# Patient Record
Sex: FEMALE | Race: WHITE | NOT HISPANIC OR LATINO | URBAN - METROPOLITAN AREA
[De-identification: names, ages, dates, MRNs, and addresses within clinical notes are randomized per-mention and may not be internally consistent; named-entity substitution may affect disease eponyms.]

---

## 2019-12-19 ENCOUNTER — EMERGENCY (EMERGENCY)
Facility: HOSPITAL | Age: 24
LOS: 1 days | Discharge: HOME | End: 2019-12-19
Attending: EMERGENCY MEDICINE
Payer: SELF-PAY

## 2019-12-19 VITALS
DIASTOLIC BLOOD PRESSURE: 93 MMHG | WEIGHT: 134.92 LBS | RESPIRATION RATE: 16 BRPM | SYSTOLIC BLOOD PRESSURE: 140 MMHG | HEART RATE: 94 BPM | OXYGEN SATURATION: 99 % | HEIGHT: 63 IN | TEMPERATURE: 99 F

## 2019-12-19 VITALS
RESPIRATION RATE: 18 BRPM | OXYGEN SATURATION: 99 % | SYSTOLIC BLOOD PRESSURE: 132 MMHG | DIASTOLIC BLOOD PRESSURE: 89 MMHG | HEART RATE: 89 BPM | TEMPERATURE: 98 F

## 2019-12-19 PROCEDURE — 99283 EMERGENCY DEPT VISIT LOW MDM: CPT

## 2019-12-19 PROCEDURE — 73130 X-RAY EXAM OF HAND: CPT | Mod: 26,RT

## 2019-12-19 PROCEDURE — 73130 X-RAY EXAM OF HAND: CPT

## 2019-12-19 RX ORDER — IBUPROFEN 200 MG
600 TABLET ORAL ONCE
Refills: 0 | Status: COMPLETED | OUTPATIENT
Start: 2019-12-19 | End: 2019-12-19

## 2019-12-19 RX ADMIN — Medication 600 MILLIGRAM(S): at 19:26
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
## 2019-12-19 NOTE — ED PROVIDER NOTE - NSFOLLOWUPINSTRUCTIONS_ED_ALL_ED_FT
IMPORTANT INSTRUCTIONS FROM Dr. ALAS:    Please follow up with your personal medical doctor or the police surgeon in 24-48 hours.   Bring results from today to your visit.    If you were advised to take any medications - be sure to review the package insert.    If your symptoms change, get worse or if you have any new symptoms, come to the ER right away.  If you have any questions, call the ER at the phone number on this page.
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
## 2019-12-19 NOTE — ED PROVIDER NOTE - CARE PLAN
Principal Discharge DX:	Hand pain, right Principal Discharge DX:	Contusion of right hand, initial encounter

## 2019-12-19 NOTE — ED PROVIDER NOTE - SKIN, MLM
no lacerations, no abrasions, no swelling. no lacerations, no abrasions, minimal r volar hand swelling.

## 2019-12-19 NOTE — ED PROVIDER NOTE - PHYSICAL EXAMINATION
Musculoskeletal: No tenderness to the wrist, bone, finger or hand. Good strength of flexion and extension. Musculoskeletal: No tenderness to the wrist, bone, finger or hand. Good strength of flexion and extension wrist/fingers. minimal swelling.
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
## 2019-12-19 NOTE — ED PROVIDER NOTE - CARE PROVIDER_API CALL
Eyal John (MD)  Plastic Surgery  [address], [address]  [place] [address]  Phone: [phone]  Fax: [phone]  Follow Up Time: 4-6 Days

## 2019-12-19 NOTE — ED PROVIDER NOTE - PATIENT PORTAL LINK FT
You can access the FollowMyHealth Patient Portal offered by [facility] by registering at the following website: [email or web address]. By joining [employer]’s FollowMyHealth portal, you will also be able to view your health information using other applications (apps) compatible with our system.

## 2020-04-16 ENCOUNTER — TRANSCRIPTION ENCOUNTER (OUTPATIENT)
Age: 25
End: 2020-04-16

## 2020-05-08 ENCOUNTER — TRANSCRIPTION ENCOUNTER (OUTPATIENT)
Age: 25
End: 2020-05-08

## 2020-09-22 ENCOUNTER — EMERGENCY (EMERGENCY)
Facility: HOSPITAL | Age: 25
LOS: 0 days | Discharge: HOME | End: 2020-09-22
Payer: OTHER MISCELLANEOUS

## 2020-09-22 VITALS
OXYGEN SATURATION: 100 % | SYSTOLIC BLOOD PRESSURE: 133 MMHG | TEMPERATURE: 98 F | HEART RATE: 93 BPM | DIASTOLIC BLOOD PRESSURE: 88 MMHG | RESPIRATION RATE: 18 BRPM | WEIGHT: 130.07 LBS
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
## 2020-09-22 DIAGNOSIS — M25.561 PAIN IN RIGHT KNEE: ICD-10-CM

## 2020-09-22 DIAGNOSIS — S83.91XA SPRAIN OF UNSPECIFIED SITE OF RIGHT KNEE, INITIAL ENCOUNTER: ICD-10-CM
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
## 2020-09-22 PROCEDURE — 99284 EMERGENCY DEPT VISIT MOD MDM: CPT

## 2020-09-22 PROCEDURE — 73562 X-RAY EXAM OF KNEE 3: CPT | Mod: 26,RT

## 2020-09-22 RX ORDER — IBUPROFEN 200 MG
600 TABLET ORAL ONCE
Refills: 0 | Status: COMPLETED | OUTPATIENT
Start: 2020-09-22 | End: 2020-09-22

## 2020-09-22 RX ADMIN — Medication 600 MILLIGRAM(S): at 19:47

## 2020-09-22 NOTE — ED ADULT NURSE NOTE - NSIMPLEMENTINTERV_GEN_ALL_ED
Implemented All Universal Safety Interventions:  [place] to call system. Call bell, personal items and telephone within reach. Instruct patient to call for assistance. Room bathroom lighting operational. Non-slip footwear when patient is off stretcher. Physically safe environment: no spills, clutter or unnecessary equipment. Stretcher in lowest position, wheels locked, appropriate side rails in place.

## 2020-09-22 NOTE — ED PROVIDER NOTE - PATIENT PORTAL LINK FT
You can access the FollowMyHealth Patient Portal offered by [facility] by registering at the following website: [email or web address]. By joining [employer]’s FollowMyHealth portal, you will also be able to view your health information using other applications (apps) compatible with our system.

## 2020-09-22 NOTE — ED ADULT NURSE NOTE - HOW OFTEN DO YOU HAVE A DRINK CONTAINING ALCOHOL?
From: Beverley Chavez  To: Layne Murcia DO  Sent: 5/17/2017 11:40 AM CDT  Subject: referral    I would like to get a referral to see orthopedic dr for right knee pain and grinding in the knee.   Monthly or less

## 2020-09-22 NOTE — ED PROVIDER NOTE - CLINICAL SUMMARY MEDICAL DECISION MAKING FREE TEXT BOX
Patient presents with knee pain after trip and fall, likely contusion, will check xray given bony tenderness, recommend nsaids, rest, ice, elevation

## 2020-09-22 NOTE — ED ADULT NURSE NOTE - CHPI ED NUR SYMPTOMS POS
Telephone Encounter by Mame Dominguez CMA at 11/28/17 04:15 PM     Author:  Mame Dominguez CMA Service:  (none) Author Type:  Certified Medical Assistant     Filed:  11/28/17 04:15 PM Encounter Date:  11/24/2017 Status:  Signed     :  Mame Dominguez CMA (Certified Medical Assistant)            Last refill:[SB1.1T] 11-21-16[SB1.1M] for #[SB1.1T]1 with 5[SB1.1M]  Last OV:[SB1.1T] 11-21-16[SB1.1M]  Routed to [SB1.1T] Dreyer[SB1.1M] for approval.[SB1.1T]        Revision History        User Key Date/Time User Provider Type Action    > SB1.1 11/28/17 04:15 PM Mame Dominguez CMA Certified Medical Assistant Sign    M - Manual, T - Template             PAIN

## 2020-09-22 NOTE — ED ADULT NURSE NOTE - OBJECTIVE STATEMENT
Received patient in FT. AOX4. 24F here with R knee pain after fall onto concrete while working today. 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 NO numbness or weakness. NO other injury

## 2020-09-22 NOTE — ED PROVIDER NOTE - OBJECTIVE STATEMENT
24F here with R knee pain after fall onto concrete while working today. 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 NO numbness or weakness. NO other injury.

## 2020-09-25 ENCOUNTER — APPOINTMENT (OUTPATIENT)
Dept: OBGYN | Facility: CLINIC | Age: 25
End: 2020-09-25
Payer: OTHER MISCELLANEOUS

## 2020-09-25 VITALS
RESPIRATION RATE: 18 BRPM | HEIGHT: 61 IN | SYSTOLIC BLOOD PRESSURE: 130 MMHG | HEART RATE: 88 BPM | WEIGHT: 140 LBS | DIASTOLIC BLOOD PRESSURE: 86 MMHG | OXYGEN SATURATION: 98 % | BODY MASS INDEX: 26.43 KG/M2

## 2020-09-25 LAB
HCG UR QL: POSITIVE
QUALITY CONTROL: YES

## 2020-09-25 PROCEDURE — 99214 OFFICE O/P EST MOD 30 MIN: CPT

## 2020-09-25 PROCEDURE — 76830 TRANSVAGINAL US NON-OB: CPT

## 2020-09-25 NOTE — PHYSICAL EXAM
[Appropriately responsive] : appropriately responsive [Alert] : alert [No Acute Distress] : no acute distress [No Lymphadenopathy] : no lymphadenopathy [Regular Rate Rhythm] : regular rate rhythm [No Murmurs] : no murmurs [Clear to Auscultation B/L] : clear to auscultation bilaterally [Soft] : soft [Non-tender] : non-tender [Non-distended] : non-distended [No HSM] : No HSM [No Lesions] : no lesions [No Mass] : no mass [Oriented x3] : oriented x3 [Labia Majora] : normal [Labia Minora] : normal [Normal] : normal [Uterine Adnexae] : normal

## 2020-09-25 NOTE — PLAN
[FreeTextEntry1] : pregnancy/ectopic precautions reviewed. pnv given. f/u 1 week or prn\par tvs- thickened endo lining 1.58cm. no adenexal masses

## 2020-09-28 PROBLEM — Z00.00 ENCOUNTER FOR PREVENTIVE HEALTH EXAMINATION: Noted: 2020-09-28

## 2020-09-29 ENCOUNTER — APPOINTMENT (OUTPATIENT)
Dept: ORTHOPEDIC SURGERY | Facility: CLINIC | Age: 25
End: 2020-09-29
Payer: OTHER MISCELLANEOUS

## 2020-09-29 DIAGNOSIS — M23.91 UNSPECIFIED INTERNAL DERANGEMENT OF RIGHT KNEE: ICD-10-CM

## 2020-09-29 PROCEDURE — 73562 X-RAY EXAM OF KNEE 3: CPT | Mod: RT

## 2020-09-29 PROCEDURE — 99203 OFFICE O/P NEW LOW 30 MIN: CPT

## 2020-10-04 PROBLEM — M23.91 INTERNAL DERANGEMENT OF RIGHT KNEE: Status: ACTIVE | Noted: 2020-10-04

## 2020-10-05 ENCOUNTER — APPOINTMENT (OUTPATIENT)
Dept: OBGYN | Facility: CLINIC | Age: 25
End: 2020-10-05

## 2020-10-06 ENCOUNTER — OUTPATIENT (OUTPATIENT)
Dept: OUTPATIENT SERVICES | Facility: HOSPITAL | Age: 25
LOS: 1 days | End: 2020-10-06

## 2020-10-06 ENCOUNTER — RESULT REVIEW (OUTPATIENT)
Age: 25
End: 2020-10-06

## 2020-10-06 ENCOUNTER — APPOINTMENT (OUTPATIENT)
Dept: MRI IMAGING | Facility: CLINIC | Age: 25
End: 2020-10-06
Payer: OTHER MISCELLANEOUS

## 2020-10-06 DIAGNOSIS — Z00.00 ENCOUNTER FOR GENERAL ADULT MEDICAL EXAMINATION WITHOUT ABNORMAL FINDINGS: ICD-10-CM

## 2020-10-06 PROCEDURE — 73721 MRI JNT OF LWR EXTRE W/O DYE: CPT | Mod: 26,RT

## 2020-10-06 NOTE — PHYSICAL EXAM
[de-identified] : Physical Exam:\par General: Well appearing, no acute distress, A&O\par Neurologic: A&Ox3, No focal deficits\par Head: NCAT without abrasions, lacerations, or ecchymosis to head, face, or scalp\par Eyes: No scleral icterus, no gross abnormalities\par Respiratory: Equal chest wall expansion bilaterally, no accessory muscle use\par Lymphatic: No lymphadenopathy palpated\par Skin: Warm and dry\par Psychiatric: Normal mood and affect\par \par Right knee\par \par Inspection/Palpation: Gait evaluation does reveal a limp. There is no inguinal adenopathy. Limb is well-perfused, without skin lesions, shows a grossly normal motor and sensory examination. \par ROM: The Right knee motion is significantly reduced and does cause significant pain. The knee exhibits a moderate effusion. \par Muscle/Nerves: Quad strength decreased secondary to injury. Motor exam 5/ distally, EHL/FHL/GSC/TA\par SILT L4-S1\par Special Tests: \par Joint line tenderness noted medial joint line at 0 and 90 degrees. \par Positive Layne test. Positive Appley grind test\par Stable in varus and valgus stress at 0 and 30 degrees\par Negative posterior drawer. \par The knee has a negative Lachman and negative anterior drawer.\par Normal hip and ankle exam. \par \par Left Knee\par \par Inspection/Palpation: There is no inguinal adenopathy. Well perfused, without skin lesions, shows a grossly normal motor and sensory examination. \par ROM: Normal\par Muscle/Nerves: Quad strength decreased secondary to injury. Motor exam 5/ distally, EHL/FHL/GSC/TA\par SILT L4-S1\par Special Tests: \par No Joint line tenderness noted  at medial and lateral joint line at 0 and 90 degrees. \par Stable in varus and valgus stress at 0 and 30 degrees\par Negative posterior drawer. \par Negative anterior drawer and stable Lachman \par Normal hip and ankle exam. [de-identified] : \par The following radiographs were ordered and read by me during this patients visit. I reviewed each radiograph in detail with the patient and discussed the findings as highlighted below. \par \par 3 views of the right knee were obtained today that show no fracture, dislocation. There is no degenerative change seen. There is no malalignment. No obvious osseous abnormality. Otherwise unremarkable.

## 2020-10-06 NOTE — REASON FOR VISIT
[Initial Visit] : an initial visit for [Worker's Compensation] : This visit is related to worker's compensation [FreeTextEntry2] : right knee pain.
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
## 2020-10-07 ENCOUNTER — APPOINTMENT (OUTPATIENT)
Dept: ANTEPARTUM | Facility: CLINIC | Age: 25
End: 2020-10-07

## 2020-10-08 ENCOUNTER — APPOINTMENT (OUTPATIENT)
Dept: ORTHOPEDIC SURGERY | Facility: CLINIC | Age: 25
End: 2020-10-08
Payer: OTHER MISCELLANEOUS

## 2020-10-08 PROCEDURE — 99214 OFFICE O/P EST MOD 30 MIN: CPT | Mod: 95

## 2020-10-08 RX ORDER — MELOXICAM 7.5 MG/1
7.5 TABLET ORAL DAILY
Qty: 21 | Refills: 1 | Status: COMPLETED | COMMUNITY
Start: 2020-10-08 | End: 2020-11-19

## 2020-10-08 NOTE — REASON FOR VISIT
[Home] : at home, [unfilled] , at the time of the visit. [Medical Office: ([facility])___] : at the medical office located in  [Verbal consent obtained from patient] : the patient, [unfilled]
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
## 2020-10-08 NOTE — DISCUSSION/SUMMARY
[de-identified] : Ruthann is a 24-year-old female with right knee pain from a likely patellar subluxation.  I do thorough discussion with her regarding the nature of her symptoms also treatment options.  We discussed operative and nonoperative management.  At this time I believe nonoperative care would offer her significant benefit.  I recommend meloxicam once daily for 21 days as well as physical therapy.  A prescription was given to her for both.  I also recommend that she does not return to work x3 weeks.  I would like to see her back before her return to work.  I believe given her exam as well as her MRI findings that she likely has moderate patellar instability at this time.  I have concern for continued injury versus worsening injury while at work.  I will see her back in 3 weeks for clinical reassessment.  She agrees with the above plan and all questions were answered.

## 2020-10-08 NOTE — REVIEW OF SYSTEMS
[Joint Pain] : joint pain [Joint Stiffness] : joint stiffness [Negative] : Heme/Lymph [FreeTextEntry9] : Right knee

## 2020-10-08 NOTE — PHYSICAL EXAM
[de-identified] : Physical Exam:\par General: Well appearing, no acute distress, A&O\par Neurologic: A&Ox3, No focal deficits\par Head: NCAT without abrasions, lacerations, or ecchymosis to head, face, or scalp\par Eyes: No scleral icterus, no gross abnormalities\par Respiratory: Equal chest wall expansion bilaterally, no accessory muscle use\par Lymphatic: No lymphadenopathy palpated\par Skin: Warm and dry\par Psychiatric: Normal mood and affect\par \par Right knee\par \par Inspection/Palpation: Gait evaluation does reveal a limp. There is no inguinal adenopathy. Limb is well-perfused, without skin lesions, shows a grossly normal motor and sensory examination. \par ROM: The Right knee motion is significantly reduced and does cause significant pain. The knee exhibits a moderate effusion. \par Muscle/Nerves: Quad strength decreased secondary to injury. Motor exam 5/ distally, EHL/FHL/GSC/TA\par SILT L4-S1\par Special Tests: \par Joint line tenderness noted medial joint line at 0 and 90 degrees. \par Positive Layne test. Positive Appley grind test\par Stable in varus and valgus stress at 0 and 30 degrees\par Negative posterior drawer. \par The knee has a negative Lachman and negative anterior drawer.\par Normal hip and ankle exam. \par \par Left Knee\par \par Inspection/Palpation: There is no inguinal adenopathy. Well perfused, without skin lesions, shows a grossly normal motor and sensory examination. \par ROM: Normal\par Muscle/Nerves: Quad strength decreased secondary to injury. Motor exam 5/ distally, EHL/FHL/GSC/TA\par SILT L4-S1\par Special Tests: \par No Joint line tenderness noted  at medial and lateral joint line at 0 and 90 degrees. \par Stable in varus and valgus stress at 0 and 30 degrees\par Negative posterior drawer. \par Negative anterior drawer and stable Lachman \par Normal hip and ankle exam. [de-identified] : MRI right knee was performed at outside facility.  Images were reviewed by wei Estrada.  They demonstrate edema at the medial portion of the patella.  No meniscus tear noted.  No ligament tear.

## 2020-10-14 ENCOUNTER — TRANSCRIPTION ENCOUNTER (OUTPATIENT)
Age: 25
End: 2020-10-14

## 2020-11-05 ENCOUNTER — APPOINTMENT (OUTPATIENT)
Dept: ORTHOPEDIC SURGERY | Facility: CLINIC | Age: 25
End: 2020-11-05
Payer: OTHER MISCELLANEOUS

## 2020-11-05 DIAGNOSIS — M22.41 CHONDROMALACIA PATELLAE, RIGHT KNEE: ICD-10-CM

## 2020-11-05 DIAGNOSIS — M25.361 OTHER INSTABILITY, RIGHT KNEE: ICD-10-CM

## 2020-11-05 PROCEDURE — 99072 ADDL SUPL MATRL&STAF TM PHE: CPT

## 2020-11-05 PROCEDURE — 99214 OFFICE O/P EST MOD 30 MIN: CPT

## 2020-11-05 NOTE — PHYSICAL EXAM
[de-identified] : Physical Exam:\par General: Well appearing, no acute distress, A&O\par Neurologic: A&Ox3, No focal deficits\par Head: NCAT without abrasions, lacerations, or ecchymosis to head, face, or scalp\par Eyes: No scleral icterus, no gross abnormalities\par Respiratory: Equal chest wall expansion bilaterally, no accessory muscle use\par Lymphatic: No lymphadenopathy palpated\par Skin: Warm and dry\par Psychiatric: Normal mood and affect\par \par Right Knee: Range of Motion in Degrees	\par 	  Claimant:	Normal:	\par Flexion Active	 135 	 135-degrees	\par Flexion Passive	 135	 135-degrees	\par Extension Active	 0-5	 0-5-degrees	\par Extension Passive	 0-5	 0-5-degrees	\par \par No tenderness over the tibial tubercle. Tenderness over the tendon at this time. No weakness to flexion/extension. Tenderness over the pes bursa. No evidence of instability in the AP plane or varus or valgus stress. Negative Lachman. Negative pivot shift. Negative anterior drawer test. Negative posterior drawer test. Negative Layne. Negative Apley grind. No medial or lateral joint line tenderness. No tenderness over the medial and lateral facet of the patella. No patellofemoral crepitations. No lateral tilting patella. No patella apprehension. No crepitation in the medial and lateral femoral condyle. No proximal or distal swelling, edema or tenderness. No gross motor or sensory deficits. No intra-articular swelling. Extra-articular swelling over the proximal medial aspect of the tibia. 2+ DP and PT pulses. No varus or valgus malalignment. Skin is intact. No rashes, scars or lesions. \par  \par Left Knee: Range of Motion in Degrees	\par 	  Claimant:	Normal:	\par Flexion Active	 135 	 135-degrees	\par Flexion Passive	 135	 135-degrees	\par Extension Active	 0-5	 0-5-degrees	\par Extension Passive	 0-5	 0-5-degrees	\par \par No weakness to flexion/extension. No evidence of instability in the AP plane or varus or valgus stress. Negative Lachman. Negative pivot shift. Negative anterior drawer test. Negative posterior drawer test. Negative Layne. Negative Apley grind. No medial or lateral joint line tenderness. No tenderness over the medial and lateral facet of the patella. No patellofemoral crepitations. No lateral tilting patella. No patellar apprehension. No crepitation in the medial and lateral femoral condyle. No proximal or distal swelling, edema or tenderness. No gross motor or sensory deficits. No intra-articular swelling. 2+ DP and PT pulses. No varus or valgus malalignment. Skin is intact. No rashes, scars or lesions. [de-identified] : MRI to right knee reveals: No meniscus tear is seen. Intact ACL, PCL, medial collateral ligament, and lateral collateral ligament complex.\par \par

## 2020-11-05 NOTE — DISCUSSION/SUMMARY
[de-identified] : GIOVANNY is a 24 year female with right knee pain secondary to patellar tendinitis and patellofemoral pain syndrome. I have explained to the patient the anatomy of the patellofemoral joint. It includes the extensor mechanism (quadriceps tendon, quadriceps muscles, patella, patella tendon, and medial and lateral retinaculum). I have shown the patient that the articular cartilage gets a little softened. This is what is known as chondromalacia. If one theoretically were to remove or scrape all the articular cartilage, it would be identified as arthritis. Somewhat paradoxically, however, chondromalacia does not necessarily lead to arthritis or at least there is no evidence irrefutable at this point in time.\par \par Chondromalacia or anterior knee pain is a syndrome that hurts the patients more than it causes destruction to the knee; thus pain is not associated with destruction. Likewise, noises, cracking, and popping for the most part are not anymore significant than people cracking their knuckles. It is certainly not a sign of damage to the joint.\par Over 90% of the people with patellofemoral problems will get better if they understand the weight bearing stresses that are applied to the patellofemoral joint. The forces can range from 2 to 9 times your body weight per step and with abnormal alignment the average is usually higher. \par \par The treatment is to minimize weight-bearing stress and to strengthen the surrounding muscles. From a practical point of view, one can divide their lifestyle into activities of daily living, conditioning, and recreation. In activities of daily living one should feel free to walk around as necessary but only for functioning. If one has an option between stairs and an escalator, the latter is preferable.\par \par The conditioning aspect should be a non weight bearing program which is best achieved by bicycle riding at least 3 to 4 days a week. It should be done with increasing resistance for at least a half hour. The seat of the bike should always be kept at a position so that the knee stays within a 0 to 90 degree arc. This will avoid hyperextension and flexion beyond 90 degrees, which tends to aggravate symptoms of discomfort. Leg extension for stretching exercises are similarly kept within this arc of motion. Step machines are not advised as they tend to aggravate patellofemoral symptoms as do squats. A [place] [employer] machine is an alternative that is usually acceptable.\par \par The recreational part of their life is based on the fact that since pain is not leading to destruction, we will compromise and allow a recreational lifestyle. If indeed activity, albeit associated with impact does not yield any symptoms, they should feel free to participate. If an increase in activities is associated with increasing discomfort, the patient should use "common sense" and cut back on the level of activity. Recreation, however, is never to be used for conditioning even in an asymptomatic patient. The patient must always maintain a conditioning program. I think the patient understands this explanation.\par \par While over 90% of the people with this syndrome will do well non-operatively, some conscientious patients will still have symptoms. If so, they should be reevaluated to ascertain if they fall into a small category of people who require physical therapy or surgery.\par \par At this time since she has improved since performing PT, she elected for nonoperative management with plyometric and running training/ evaluation through physical therapy to see if she is fit to return to work as a . She will call me and let me know how she is doing. If her therapist feels she is not ready we will keep her out of work until we see her again in 4-6 weeks of more PT. She agrees with the above plan and all questions were answered.
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
## 2020-11-05 NOTE — REASON FOR VISIT
[Follow-Up Visit] : a follow-up visit for [Worker's Compensation] : This visit is related to worker's compensation [FreeTextEntry2] : right knee pain.

## 2021-07-24 ENCOUNTER — EMERGENCY (EMERGENCY)
Facility: HOSPITAL | Age: 26
LOS: 1 days | Discharge: HOME | End: 2021-07-24
Admitting: STUDENT IN AN ORGANIZED HEALTH CARE EDUCATION/TRAINING PROGRAM
Payer: OTHER MISCELLANEOUS

## 2021-07-24 VITALS
HEIGHT: 63 IN | RESPIRATION RATE: 18 BRPM | OXYGEN SATURATION: 100 % | HEART RATE: 90 BPM | SYSTOLIC BLOOD PRESSURE: 125 MMHG | DIASTOLIC BLOOD PRESSURE: 81 MMHG | TEMPERATURE: 98 F

## 2021-07-24 PROCEDURE — 99283 EMERGENCY DEPT VISIT LOW MDM: CPT

## 2021-07-24 RX ORDER — ACETAMINOPHEN 500 MG
650 TABLET ORAL ONCE
Refills: 0 | Status: COMPLETED | OUTPATIENT
Start: 2021-07-24 | End: 2021-07-24

## 2021-07-24 RX ADMIN — Medication 650 MILLIGRAM(S): at 22:10
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
## 2021-07-24 NOTE — ED PROVIDER NOTE - EYES, MLM
Clear bilaterally, pupils equal, round and reactive to light. EOMI. No erythema, Vision 20/20 bilaterally.

## 2021-07-24 NOTE — ED PROVIDER NOTE - PATIENT PORTAL LINK FT
You can access the FollowMyHealth Patient Portal offered by [facility] by registering at the following website: [email or web address]. By joining [employer]’s FollowMyHealth portal, you will also be able to view your health information using other applications (apps) compatible with our system.

## 2021-07-24 NOTE — ED PROVIDER NOTE - IV ALTEPLASE ADMIN OUTSIDE HIDDEN
show Opioid Pregnancy And Lactation Text: These medications can lead to premature delivery and should be avoided during pregnancy. These medications are also present in breast milk in small amounts.
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
## 2022-05-03 NOTE — ED PROVIDER NOTE - EYES, MLM
Clear bilaterally, pupils equal, round and reactive to light. FAMILY HISTORY:  No pertinent family history in first degree relatives

## 2023-06-13 ENCOUNTER — NON-APPOINTMENT (OUTPATIENT)
Age: 28
End: 2023-06-13

## 2023-06-19 ENCOUNTER — LABORATORY RESULT (OUTPATIENT)
Age: 28
End: 2023-06-19

## 2023-06-20 ENCOUNTER — APPOINTMENT (OUTPATIENT)
Dept: MATERNAL FETAL MEDICINE | Facility: CLINIC | Age: 28
End: 2023-06-20

## 2023-06-20 ENCOUNTER — APPOINTMENT (OUTPATIENT)
Dept: MATERNAL FETAL MEDICINE | Facility: CLINIC | Age: 28
End: 2023-06-20
Payer: COMMERCIAL

## 2023-06-20 ENCOUNTER — ASOB RESULT (OUTPATIENT)
Age: 28
End: 2023-06-20

## 2023-06-20 ENCOUNTER — APPOINTMENT (OUTPATIENT)
Dept: ANTEPARTUM | Facility: CLINIC | Age: 28
End: 2023-06-20
Payer: COMMERCIAL

## 2023-06-20 PROCEDURE — 36415 COLL VENOUS BLD VENIPUNCTURE: CPT

## 2023-06-20 PROCEDURE — 76813 OB US NUCHAL MEAS 1 GEST: CPT

## 2023-06-20 PROCEDURE — 99202 OFFICE O/P NEW SF 15 MIN: CPT | Mod: 95

## 2023-06-20 PROCEDURE — 36416 COLLJ CAPILLARY BLOOD SPEC: CPT

## 2023-06-21 NOTE — ED ADULT TRIAGE NOTE - INTERNATIONAL TRAVEL
Janet from the OT department at [facility] regarding patient. Patient has come to the desk twice within the last week or two stating that he has an appointment for driving evaluation, or requesting an appointment for driving evaluation. She stated that their evaluation is not suited for the patient (uses a simulator with pedal controls) and can not help with licensure, just driving recommendations. She recommends reaching out to the [employer] and seeing how to refer the patient there, etc.   
Reached out to adaptive driving program to see if insurance covers.  
No

## 2023-06-26 LAB
ADDITIONAL US: NORMAL
COMMENTS: AFP: NORMAL
CRL SCAN TWIN B: NORMAL
CRL SCAN: NORMAL
CROWN RUMP LENGTH TWIN B: NORMAL
CROWN RUMP LENGTH: 72 MM
DOWN SYNDROME AGE RISK: NORMAL
DOWN SYNDROME INTERPRETATION: NORMAL
DOWN SYNDROME SCREENING RISK: NORMAL
GEST. AGE ON COLLECTION DATE: 13.1 WEEKS
HCG MOM: 0.55
HCG VALUE: 46.1 IU/ML
MATERNAL AGE AT EDD: 30.1 YR
NOTE: AFP: NORMAL
NT MOM TWIN B: NORMAL
NT TWIN B: NORMAL
NUCHAL TRANSLUCENCY (NT): 2.3 MM
NUCHAL TRANSLUCENCY MOM: 1.23
NUMBER OF FETUSES: 1
PAPP-A MOM: 1.02
PAPP-A VALUE: 1213.6 NG/ML
RACE: NORMAL
RESULTS AFP: NORMAL
SONOGRAPHER ID#: NORMAL
SUBMIT PART 2 SAMPLE USING: NORMAL
TEST RESULTS: AFP: NORMAL
TRISOMY 18 AGE RISK: NORMAL
TRISOMY 18 INTERPRETATION: NORMAL
TRISOMY 18 SCREENING RISK: NORMAL
WEIGHT AFP: 160 LBS

## 2023-06-30 ENCOUNTER — APPOINTMENT (OUTPATIENT)
Dept: OBGYN | Facility: CLINIC | Age: 28
End: 2023-06-30
Payer: COMMERCIAL

## 2023-06-30 VITALS
SYSTOLIC BLOOD PRESSURE: 109 MMHG | DIASTOLIC BLOOD PRESSURE: 70 MMHG | BODY MASS INDEX: 30.8 KG/M2 | WEIGHT: 163 LBS | HEART RATE: 80 BPM

## 2023-06-30 DIAGNOSIS — Z98.890 OTHER SPECIFIED POSTPROCEDURAL STATES: ICD-10-CM

## 2023-06-30 DIAGNOSIS — Z83.438 FAMILY HISTORY OF OTHER DISORDER OF LIPOPROTEIN METABOLISM AND OTHER LIPIDEMIA: ICD-10-CM

## 2023-06-30 DIAGNOSIS — Z80.1 FAMILY HISTORY OF MALIGNANT NEOPLASM OF TRACHEA, BRONCHUS AND LUNG: ICD-10-CM

## 2023-06-30 DIAGNOSIS — Z87.42 OTHER SPECIFIED POSTPROCEDURAL STATES: ICD-10-CM

## 2023-06-30 DIAGNOSIS — Z87.42 PERSONAL HISTORY OF OTHER DISEASES OF THE FEMALE GENITAL TRACT: ICD-10-CM

## 2023-06-30 DIAGNOSIS — Z80.3 FAMILY HISTORY OF MALIGNANT NEOPLASM OF BREAST: ICD-10-CM

## 2023-06-30 DIAGNOSIS — Z80.0 FAMILY HISTORY OF MALIGNANT NEOPLASM OF DIGESTIVE ORGANS: ICD-10-CM

## 2023-06-30 LAB — HCG UR QL: POSITIVE

## 2023-06-30 PROCEDURE — 99212 OFFICE O/P EST SF 10 MIN: CPT

## 2023-06-30 PROCEDURE — 81025 URINE PREGNANCY TEST: CPT

## 2023-06-30 RX ORDER — THIAMINE HCL 100 MG
500 TABLET ORAL
Refills: 0 | Status: ACTIVE | COMMUNITY

## 2023-06-30 RX ORDER — PRENATAL VIT 49/IRON FUM/FOLIC 6.75-0.2MG
TABLET ORAL
Refills: 0 | Status: ACTIVE | COMMUNITY

## 2023-06-30 RX ORDER — FOLIC ACID 20 MG
CAPSULE ORAL
Refills: 0 | Status: ACTIVE | COMMUNITY

## 2023-06-30 RX ORDER — UBIDECARENONE/VIT E ACET 100MG-5
CAPSULE ORAL
Refills: 0 | Status: ACTIVE | COMMUNITY

## 2023-06-30 NOTE — HISTORY OF PRESENT ILLNESS
[FreeTextEntry1] : Patient is a 27-year-old  2 para 0-0-1-0 last menstrual period 2023\par Patient presents as a transfer to continue her prenatal OB care

## 2023-06-30 NOTE — PLAN
[FreeTextEntry1] : Patient is a 27-year-old  2 para 0-0-1-0 last menstrual period 2023\par Patient presents as initial visit to establish and continue her OB prenatal care\par Patient denies any complaints\par Patient was seen in the exam room and states she had a recent full exam by Dr. Matias approximately 5 months prior but at that point Pap smear was not performed\par Pap smear was performed during this visit\par Patient was brought to the consultation room along with her  and the remainder of the assessment took place in the consultation room\par Past medical history,,, patient denies\par Past surgical history,,, breast reduction, rhinoplasty, cervical polypectomy\par Allergies,,, patient denies\par Medications,,, prenatal vitamins and folic acid\par Past OB history,,, spontaneous AB x1\par Past GYN history,,, menarche age 7, interval 28, duration 5 to 6 days\par Patient states that she had been on birth control pills approximately 3 years total in the past denies history of PID but states she had diagnosis of chlamydia x1\par Tobacco use,,, patient denies\par Alcohol use,,, social use\par Drug use,,, patient denies\par Family history,,, mother alive history of hypothyroidism, father alive with high cholesterol, paternal grandmother history of colon cancer and breast cancer, and maternal great aunt with history of breast cancer\par Patient advised to consider having the Sema 4 testing at some point\par Patient was seen by Olga and appropriate referrals for maternal-fetal medicine consults and evaluation and also the new OB blood panel blood work to be performed\par Follow-up 4 weeks to continue her OB care

## 2023-07-03 LAB
ABO + RH PNL BLD: NORMAL
ALBUMIN SERPL ELPH-MCNC: 3.5 G/DL
ALP BLD-CCNC: 58 U/L
ALT SERPL-CCNC: 14 U/L
ANION GAP SERPL CALC-SCNC: 11 MMOL/L
AST SERPL-CCNC: 17 U/L
BILIRUB SERPL-MCNC: 0.6 MG/DL
BLD GP AB SCN SERPL QL: NORMAL
BUN SERPL-MCNC: 7 MG/DL
C TRACH RRNA SPEC QL NAA+PROBE: NOT DETECTED
CALCIUM SERPL-MCNC: 8.5 MG/DL
CHLORIDE SERPL-SCNC: 107 MMOL/L
CMV IGG SERPL QL: 3.6 U/ML
CMV IGG SERPL-IMP: POSITIVE
CMV IGM SERPL QL: <8 AU/ML
CMV IGM SERPL QL: NEGATIVE
CO2 SERPL-SCNC: 21 MMOL/L
CREAT SERPL-MCNC: 0.67 MG/DL
EGFR: 121 ML/MIN/1.73M2
ESTIMATED AVERAGE GLUCOSE: 82 MG/DL
GLUCOSE SERPL-MCNC: 88 MG/DL
HBA1C MFR BLD HPLC: 4.5 %
HBV SURFACE AG SER QL: NONREACTIVE
HCV AB SER QL: NONREACTIVE
HCV S/CO RATIO: 0.08 S/CO
HGB A MFR BLD: 97.5 %
HGB A2 MFR BLD: 2.5 %
HGB FRACT BLD-IMP: NORMAL
HIV1+2 AB SPEC QL IA.RAPID: NONREACTIVE
HPV HIGH+LOW RISK DNA PNL CVX: NOT DETECTED
LEAD BLD-MCNC: <1 UG/DL
MEV IGG FLD QL IA: >300 AU/ML
MEV IGG+IGM SER-IMP: POSITIVE
MUV AB SER-ACNC: POSITIVE
MUV IGG SER QL IA: 242 AU/ML
N GONORRHOEA RRNA SPEC QL NAA+PROBE: NOT DETECTED
POTASSIUM SERPL-SCNC: 4.1 MMOL/L
PROT SERPL-MCNC: 5.6 G/DL
RUBV IGG FLD-ACNC: 7.8 INDEX
RUBV IGG SER-IMP: POSITIVE
RUBV IGM FLD-ACNC: <20 AU/ML
SODIUM SERPL-SCNC: 139 MMOL/L
SOURCE TP AMPLIFICATION: NORMAL
T GONDII AB SER-IMP: NEGATIVE
T GONDII AB SER-IMP: NEGATIVE
T GONDII IGG SER QL: <3 IU/ML
T GONDII IGM SER QL: <3 AU/ML
T PALLIDUM AB SER QL IA: NEGATIVE
TSH SERPL-ACNC: 1.05 UIU/ML
VZV AB TITR SER: POSITIVE
VZV IGG SER IF-ACNC: 358.5 INDEX
VZV IGM SER IF-ACNC: <0.91 INDEX

## 2023-07-07 ENCOUNTER — NON-APPOINTMENT (OUTPATIENT)
Age: 28
End: 2023-07-07

## 2023-07-07 LAB
AR GENE MUT ANL BLD/T: NORMAL
B19V IGG SER QL IA: 5.65 INDEX
B19V IGG+IGM SER-IMP: NORMAL
B19V IGG+IGM SER-IMP: POSITIVE
B19V IGM FLD-ACNC: 0.16 INDEX
B19V IGM SER-ACNC: NEGATIVE
CYTOLOGY CVX/VAG DOC THIN PREP: ABNORMAL

## 2023-07-07 RX ORDER — TERCONAZOLE 8 MG/G
0.8 CREAM VAGINAL
Qty: 1 | Refills: 1 | Status: ACTIVE | COMMUNITY
Start: 2023-07-07 | End: 1900-01-01

## 2023-07-10 DIAGNOSIS — L30.9 DERMATITIS, UNSPECIFIED: ICD-10-CM

## 2023-07-10 DIAGNOSIS — Z86.19 PERSONAL HISTORY OF OTHER INFECTIOUS AND PARASITIC DISEASES: ICD-10-CM

## 2023-07-10 LAB
CFTR MUT TESTED BLD/T: NEGATIVE
FMR1 GENE MUT ANL BLD/T: NORMAL

## 2023-07-10 RX ORDER — CLOBETASOL PROPIONATE 0.5 MG/G
0.05 CREAM TOPICAL TWICE DAILY
Qty: 1 | Refills: 1 | Status: ACTIVE | COMMUNITY
Start: 2023-07-10 | End: 1900-01-01

## 2023-07-21 ENCOUNTER — NON-APPOINTMENT (OUTPATIENT)
Age: 28
End: 2023-07-21

## 2023-07-21 ENCOUNTER — APPOINTMENT (OUTPATIENT)
Dept: ANTEPARTUM | Facility: CLINIC | Age: 28
End: 2023-07-21
Payer: COMMERCIAL

## 2023-07-21 PROCEDURE — 36415 COLL VENOUS BLD VENIPUNCTURE: CPT

## 2023-07-27 LAB
ADDITIONAL US: NORMAL
AFP MOM: 0.67
AFP VALUE: 24.9 NG/ML
COLLECTED ON 2: NORMAL
COLLECTED ON: NORMAL
CRL SCAN TWIN B: NORMAL
CRL SCAN: NORMAL
CROWN RUMP LENGTH TWIN B: NORMAL
CROWN RUMP LENGTH: 72 MM
DIA MOM: 0.61
DIA VALUE: 86.4 PG/ML
DOWN SYNDROME AGE RISK: NORMAL
DOWN SYNDROME INTERPRETATION: NORMAL
DOWN SYNDROME SCREENING RISK: NORMAL
FIRST TRIMESTER SAMPLE: NORMAL
GEST. AGE ON COLLECTION DATE: 13.1 WEEKS
GESTATIONAL AGE: 17.6 WEEKS
HCG MOM: 0.51
HCG VALUE: 13.3 IU/ML
INSULIN DEP DIABETES: NO
MATERNAL AGE AT EDD: 30.1 YR
NT MOM TWIN B: NORMAL
NT TWIN B: NORMAL
NUCHAL TRANSLUCENCY (NT): 2.3 MM
NUCHAL TRANSLUCENCY MOM: 1.23
NUMBER OF FETUSES: 1
OPEN SPINA BIFIDA: NORMAL
OSB INTERPRETATION: NORMAL
PAPP-A MOM: 1.02
PAPP-A VALUE: 1213.6 NG/ML
RACE: NORMAL
SECOND TRIMESTER SAMPLE: NORMAL
SEQUENTIAL 2 COMMENTS: NORMAL
SEQUENTIAL 2 NOTE: NORMAL
SEQUENTIAL 2 RESULTS: NORMAL
SEQUENTIAL 2 TEST RESULTS: NORMAL
SONOGRAPHER ID#: NORMAL
TRISOMY 18 AGE RISK: NORMAL
TRISOMY 18 INTERPRETATION: NORMAL
TRISOMY 18 SCREENING RISK: NORMAL
UE3 MOM: 0.57
UE3 VALUE: 0.71 NG/ML
WEIGHT AFP: 160 LBS
WEIGHT: 168 LBS

## 2023-07-28 ENCOUNTER — NON-APPOINTMENT (OUTPATIENT)
Age: 28
End: 2023-07-28

## 2023-07-28 ENCOUNTER — APPOINTMENT (OUTPATIENT)
Dept: OBGYN | Facility: CLINIC | Age: 28
End: 2023-07-28
Payer: COMMERCIAL

## 2023-07-28 VITALS
SYSTOLIC BLOOD PRESSURE: 119 MMHG | HEART RATE: 98 BPM | WEIGHT: 170 LBS | DIASTOLIC BLOOD PRESSURE: 77 MMHG | BODY MASS INDEX: 32.12 KG/M2

## 2023-07-28 LAB
BILIRUB UR QL STRIP: NORMAL
GLUCOSE UR-MCNC: NORMAL
HCG UR QL: 0.2 EU/DL
HGB UR QL STRIP.AUTO: NORMAL
KETONES UR-MCNC: NORMAL
LEUKOCYTE ESTERASE UR QL STRIP: NORMAL
NITRITE UR QL STRIP: NORMAL
PH UR STRIP: 7.5
PROT UR STRIP-MCNC: NORMAL
SP GR UR STRIP: 1.02

## 2023-07-28 PROCEDURE — 81002 URINALYSIS NONAUTO W/O SCOPE: CPT | Mod: NC

## 2023-07-28 PROCEDURE — 0502F SUBSEQUENT PRENATAL CARE: CPT

## 2023-08-11 ENCOUNTER — APPOINTMENT (OUTPATIENT)
Dept: ANTEPARTUM | Facility: CLINIC | Age: 28
End: 2023-08-11
Payer: COMMERCIAL

## 2023-08-11 ENCOUNTER — NON-APPOINTMENT (OUTPATIENT)
Age: 28
End: 2023-08-11

## 2023-08-11 ENCOUNTER — ASOB RESULT (OUTPATIENT)
Age: 28
End: 2023-08-11

## 2023-08-11 PROCEDURE — 76817 TRANSVAGINAL US OBSTETRIC: CPT

## 2023-08-11 PROCEDURE — 76811 OB US DETAILED SNGL FETUS: CPT

## 2023-08-17 ENCOUNTER — NON-APPOINTMENT (OUTPATIENT)
Age: 28
End: 2023-08-17

## 2023-08-17 DIAGNOSIS — Z3A.18 18 WEEKS GESTATION OF PREGNANCY: ICD-10-CM

## 2023-08-25 ENCOUNTER — NON-APPOINTMENT (OUTPATIENT)
Age: 28
End: 2023-08-25

## 2023-08-25 ENCOUNTER — APPOINTMENT (OUTPATIENT)
Dept: OBGYN | Facility: CLINIC | Age: 28
End: 2023-08-25
Payer: COMMERCIAL

## 2023-08-25 VITALS
BODY MASS INDEX: 33.26 KG/M2 | DIASTOLIC BLOOD PRESSURE: 79 MMHG | WEIGHT: 176 LBS | SYSTOLIC BLOOD PRESSURE: 128 MMHG | HEART RATE: 116 BPM

## 2023-08-25 DIAGNOSIS — Z32.01 ENCOUNTER FOR PREGNANCY TEST, RESULT POSITIVE: ICD-10-CM

## 2023-08-25 DIAGNOSIS — O35.10X0 MATERNAL CARE FOR (SUSPECTED) CHROMOSOMAL ABNORMALITY IN FETUS, UNSPECIFIED, NOT APPLICABLE OR UNSPECIFIED: ICD-10-CM

## 2023-08-25 PROCEDURE — 81002 URINALYSIS NONAUTO W/O SCOPE: CPT | Mod: NC

## 2023-08-25 PROCEDURE — 0502F SUBSEQUENT PRENATAL CARE: CPT

## 2023-08-28 LAB
BILIRUB UR QL STRIP: NORMAL
GLUCOSE UR-MCNC: NORMAL
HCG UR QL: 0.2 EU/DL
HGB UR QL STRIP.AUTO: NORMAL
KETONES UR-MCNC: NORMAL
LEUKOCYTE ESTERASE UR QL STRIP: NORMAL
NITRITE UR QL STRIP: NORMAL
PH UR STRIP: 7
PROT UR STRIP-MCNC: NORMAL
SP GR UR STRIP: 1.02

## 2023-09-15 ENCOUNTER — NON-APPOINTMENT (OUTPATIENT)
Age: 28
End: 2023-09-15

## 2023-09-26 ENCOUNTER — APPOINTMENT (OUTPATIENT)
Dept: OBGYN | Facility: CLINIC | Age: 28
End: 2023-09-26
Payer: COMMERCIAL

## 2023-09-26 VITALS
BODY MASS INDEX: 34.58 KG/M2 | DIASTOLIC BLOOD PRESSURE: 86 MMHG | WEIGHT: 183 LBS | HEART RATE: 105 BPM | SYSTOLIC BLOOD PRESSURE: 113 MMHG

## 2023-09-26 DIAGNOSIS — Z3A.22 22 WEEKS GESTATION OF PREGNANCY: ICD-10-CM

## 2023-09-26 PROCEDURE — 81002 URINALYSIS NONAUTO W/O SCOPE: CPT | Mod: NC

## 2023-09-26 PROCEDURE — 0502F SUBSEQUENT PRENATAL CARE: CPT

## 2023-10-06 ENCOUNTER — APPOINTMENT (OUTPATIENT)
Dept: ANTEPARTUM | Facility: CLINIC | Age: 28
End: 2023-10-06
Payer: COMMERCIAL

## 2023-10-06 ENCOUNTER — ASOB RESULT (OUTPATIENT)
Age: 28
End: 2023-10-06

## 2023-10-06 PROCEDURE — 76816 OB US FOLLOW-UP PER FETUS: CPT

## 2023-10-09 LAB
BASOPHILS # BLD AUTO: 0.07 K/UL
BASOPHILS NFR BLD AUTO: 0.6 %
EOSINOPHIL # BLD AUTO: 0.19 K/UL
EOSINOPHIL NFR BLD AUTO: 1.6 %
GLUCOSE 1H P 100 G GLC PO SERPL-MCNC: 142 MG/DL
HCT VFR BLD CALC: 34.2 %
HGB BLD-MCNC: 11.8 G/DL
IMM GRANULOCYTES NFR BLD AUTO: 1.1 %
LYMPHOCYTES # BLD AUTO: 1.45 K/UL
LYMPHOCYTES NFR BLD AUTO: 12.5 %
MAN DIFF?: NORMAL
MCHC RBC-ENTMCNC: 29 PG
MCHC RBC-ENTMCNC: 34.5 GM/DL
MCV RBC AUTO: 84 FL
MONOCYTES # BLD AUTO: 0.6 K/UL
MONOCYTES NFR BLD AUTO: 5.2 %
NEUTROPHILS # BLD AUTO: 9.16 K/UL
NEUTROPHILS NFR BLD AUTO: 79 %
PLATELET # BLD AUTO: 226 K/UL
RBC # BLD: 4.07 M/UL
RBC # FLD: 13.5 %
WBC # FLD AUTO: 11.6 K/UL

## 2023-10-12 ENCOUNTER — APPOINTMENT (OUTPATIENT)
Dept: OBGYN | Facility: CLINIC | Age: 28
End: 2023-10-12
Payer: COMMERCIAL

## 2023-10-12 ENCOUNTER — RESULT REVIEW (OUTPATIENT)
Age: 28
End: 2023-10-12

## 2023-10-12 ENCOUNTER — NON-APPOINTMENT (OUTPATIENT)
Age: 28
End: 2023-10-12

## 2023-10-12 VITALS
HEIGHT: 61 IN | SYSTOLIC BLOOD PRESSURE: 127 MMHG | WEIGHT: 183 LBS | HEART RATE: 112 BPM | DIASTOLIC BLOOD PRESSURE: 84 MMHG | BODY MASS INDEX: 34.55 KG/M2

## 2023-10-12 DIAGNOSIS — Z3A.26 26 WEEKS GESTATION OF PREGNANCY: ICD-10-CM

## 2023-10-12 DIAGNOSIS — N63.11 UNSPECIFIED LUMP IN THE RIGHT BREAST, UPPER OUTER QUADRANT: ICD-10-CM

## 2023-10-12 PROCEDURE — 0502F SUBSEQUENT PRENATAL CARE: CPT

## 2023-10-12 PROCEDURE — 81002 URINALYSIS NONAUTO W/O SCOPE: CPT | Mod: NC

## 2023-10-19 ENCOUNTER — APPOINTMENT (OUTPATIENT)
Dept: ULTRASOUND IMAGING | Facility: CLINIC | Age: 28
End: 2023-10-19
Payer: COMMERCIAL

## 2023-10-19 ENCOUNTER — RESULT REVIEW (OUTPATIENT)
Age: 28
End: 2023-10-19

## 2023-10-19 ENCOUNTER — OUTPATIENT (OUTPATIENT)
Dept: OUTPATIENT SERVICES | Facility: HOSPITAL | Age: 28
LOS: 1 days | End: 2023-10-19
Payer: COMMERCIAL

## 2023-10-19 DIAGNOSIS — N63.11 UNSPECIFIED LUMP IN THE RIGHT BREAST, UPPER OUTER QUADRANT: ICD-10-CM

## 2023-10-19 PROCEDURE — 76642 ULTRASOUND BREAST LIMITED: CPT

## 2023-10-19 PROCEDURE — 76642 ULTRASOUND BREAST LIMITED: CPT | Mod: 26,RT

## 2023-10-20 ENCOUNTER — APPOINTMENT (OUTPATIENT)
Dept: OBGYN | Facility: CLINIC | Age: 28
End: 2023-10-20
Payer: COMMERCIAL

## 2023-10-20 VITALS
SYSTOLIC BLOOD PRESSURE: 123 MMHG | HEART RATE: 111 BPM | DIASTOLIC BLOOD PRESSURE: 81 MMHG | WEIGHT: 185 LBS | BODY MASS INDEX: 34.96 KG/M2

## 2023-10-20 DIAGNOSIS — Z87.898 PERSONAL HISTORY OF OTHER SPECIFIED CONDITIONS: ICD-10-CM

## 2023-10-20 DIAGNOSIS — Z12.39 ENCOUNTER FOR OTHER SCREENING FOR MALIGNANT NEOPLASM OF BREAST: ICD-10-CM

## 2023-10-20 PROCEDURE — 0502F SUBSEQUENT PRENATAL CARE: CPT

## 2023-10-20 PROCEDURE — 81002 URINALYSIS NONAUTO W/O SCOPE: CPT | Mod: NC

## 2023-10-27 ENCOUNTER — APPOINTMENT (OUTPATIENT)
Dept: ULTRASOUND IMAGING | Facility: CLINIC | Age: 28
End: 2023-10-27
Payer: COMMERCIAL

## 2023-10-27 ENCOUNTER — OUTPATIENT (OUTPATIENT)
Dept: OUTPATIENT SERVICES | Facility: HOSPITAL | Age: 28
LOS: 1 days | End: 2023-10-27
Payer: COMMERCIAL

## 2023-10-27 DIAGNOSIS — N63.11 UNSPECIFIED LUMP IN THE RIGHT BREAST, UPPER OUTER QUADRANT: ICD-10-CM

## 2023-10-27 PROCEDURE — 19083 BX BREAST 1ST LESION US IMAG: CPT

## 2023-10-27 PROCEDURE — 19083 BX BREAST 1ST LESION US IMAG: CPT | Mod: RT

## 2023-10-27 PROCEDURE — A4648: CPT

## 2023-10-27 PROCEDURE — 88305 TISSUE EXAM BY PATHOLOGIST: CPT

## 2023-10-27 PROCEDURE — 88305 TISSUE EXAM BY PATHOLOGIST: CPT | Mod: 26

## 2023-11-03 ENCOUNTER — NON-APPOINTMENT (OUTPATIENT)
Age: 28
End: 2023-11-03

## 2023-11-03 ENCOUNTER — APPOINTMENT (OUTPATIENT)
Dept: OBGYN | Facility: CLINIC | Age: 28
End: 2023-11-03
Payer: COMMERCIAL

## 2023-11-03 DIAGNOSIS — Z3A.30 30 WEEKS GESTATION OF PREGNANCY: ICD-10-CM

## 2023-11-07 ENCOUNTER — NON-APPOINTMENT (OUTPATIENT)
Age: 28
End: 2023-11-07

## 2023-11-07 ENCOUNTER — RESULT REVIEW (OUTPATIENT)
Age: 28
End: 2023-11-07

## 2023-11-07 ENCOUNTER — APPOINTMENT (OUTPATIENT)
Dept: OBGYN | Facility: CLINIC | Age: 28
End: 2023-11-07
Payer: COMMERCIAL

## 2023-11-07 VITALS
SYSTOLIC BLOOD PRESSURE: 126 MMHG | DIASTOLIC BLOOD PRESSURE: 82 MMHG | BODY MASS INDEX: 35.52 KG/M2 | WEIGHT: 188 LBS | HEART RATE: 111 BPM

## 2023-11-07 DIAGNOSIS — Z00.00 ENCOUNTER FOR GENERAL ADULT MEDICAL EXAMINATION W/OUT ABNORMAL FINDINGS: ICD-10-CM

## 2023-11-07 DIAGNOSIS — Z13.0 ENCOUNTER FOR SCREENING FOR DISEASES OF THE BLOOD AND BLOOD-FORMING ORGANS AND CERTAIN DISORDERS INVOLVING THE IMMUNE MECHANISM: ICD-10-CM

## 2023-11-07 LAB
BILIRUB UR QL STRIP: NORMAL
GLUCOSE UR-MCNC: NORMAL
HCG UR QL: 0.2 EU/DL
HGB UR QL STRIP.AUTO: NORMAL
KETONES UR-MCNC: NORMAL
LEUKOCYTE ESTERASE UR QL STRIP: NORMAL
NITRITE UR QL STRIP: NORMAL
PH UR STRIP: 6.5
PROT UR STRIP-MCNC: NORMAL
SP GR UR STRIP: 1.02

## 2023-11-07 PROCEDURE — 0502F SUBSEQUENT PRENATAL CARE: CPT

## 2023-11-07 PROCEDURE — 81002 URINALYSIS NONAUTO W/O SCOPE: CPT | Mod: NC

## 2023-11-09 ENCOUNTER — OUTPATIENT (OUTPATIENT)
Dept: OUTPATIENT SERVICES | Facility: HOSPITAL | Age: 28
LOS: 1 days | End: 2023-11-09

## 2023-11-09 ENCOUNTER — APPOINTMENT (OUTPATIENT)
Dept: ULTRASOUND IMAGING | Facility: CLINIC | Age: 28
End: 2023-11-09
Payer: COMMERCIAL

## 2023-11-09 DIAGNOSIS — Z00.8 ENCOUNTER FOR OTHER GENERAL EXAMINATION: ICD-10-CM

## 2023-11-09 PROCEDURE — 76705 ECHO EXAM OF ABDOMEN: CPT | Mod: 26

## 2023-11-13 ENCOUNTER — APPOINTMENT (OUTPATIENT)
Dept: ANTEPARTUM | Facility: CLINIC | Age: 28
End: 2023-11-13
Payer: COMMERCIAL

## 2023-11-13 ENCOUNTER — NON-APPOINTMENT (OUTPATIENT)
Age: 28
End: 2023-11-13

## 2023-11-13 ENCOUNTER — ASOB RESULT (OUTPATIENT)
Age: 28
End: 2023-11-13

## 2023-11-13 PROCEDURE — 76816 OB US FOLLOW-UP PER FETUS: CPT

## 2023-11-13 PROCEDURE — 76818 FETAL BIOPHYS PROFILE W/NST: CPT

## 2023-11-13 PROCEDURE — ZZZZZ: CPT

## 2023-11-14 DIAGNOSIS — Z3A.32 32 WEEKS GESTATION OF PREGNANCY: ICD-10-CM

## 2023-11-17 ENCOUNTER — NON-APPOINTMENT (OUTPATIENT)
Age: 28
End: 2023-11-17

## 2023-11-21 ENCOUNTER — APPOINTMENT (OUTPATIENT)
Dept: OBGYN | Facility: CLINIC | Age: 28
End: 2023-11-21
Payer: COMMERCIAL

## 2023-11-21 VITALS
BODY MASS INDEX: 36.47 KG/M2 | HEART RATE: 112 BPM | SYSTOLIC BLOOD PRESSURE: 122 MMHG | DIASTOLIC BLOOD PRESSURE: 81 MMHG | WEIGHT: 193 LBS

## 2023-11-21 LAB
BILIRUB UR QL STRIP: NORMAL
GLUCOSE UR-MCNC: NORMAL
HCG UR QL: 0.2 EU/DL
HGB UR QL STRIP.AUTO: NORMAL
KETONES UR-MCNC: NORMAL
LEUKOCYTE ESTERASE UR QL STRIP: NORMAL
NITRITE UR QL STRIP: NORMAL
PH UR STRIP: 6.5
PROT UR STRIP-MCNC: NORMAL
SP GR UR STRIP: 1.03

## 2023-11-21 PROCEDURE — 0502F SUBSEQUENT PRENATAL CARE: CPT

## 2023-11-21 PROCEDURE — 81002 URINALYSIS NONAUTO W/O SCOPE: CPT | Mod: NC

## 2023-11-22 ENCOUNTER — APPOINTMENT (OUTPATIENT)
Dept: ANTEPARTUM | Facility: CLINIC | Age: 28
End: 2023-11-22
Payer: COMMERCIAL

## 2023-11-22 ENCOUNTER — ASOB RESULT (OUTPATIENT)
Age: 28
End: 2023-11-22

## 2023-11-22 PROCEDURE — ZZZZZ: CPT

## 2023-11-22 PROCEDURE — 76818 FETAL BIOPHYS PROFILE W/NST: CPT

## 2023-11-28 ENCOUNTER — NON-APPOINTMENT (OUTPATIENT)
Age: 28
End: 2023-11-28

## 2023-11-29 ENCOUNTER — APPOINTMENT (OUTPATIENT)
Dept: ANTEPARTUM | Facility: CLINIC | Age: 28
End: 2023-11-29
Payer: COMMERCIAL

## 2023-11-29 ENCOUNTER — ASOB RESULT (OUTPATIENT)
Age: 28
End: 2023-11-29

## 2023-11-29 PROCEDURE — 76818 FETAL BIOPHYS PROFILE W/NST: CPT

## 2023-11-29 PROCEDURE — 76821 MIDDLE CEREBRAL ARTERY ECHO: CPT

## 2023-11-29 PROCEDURE — 76820 UMBILICAL ARTERY ECHO: CPT

## 2023-11-30 ENCOUNTER — NON-APPOINTMENT (OUTPATIENT)
Age: 28
End: 2023-11-30

## 2023-11-30 ENCOUNTER — APPOINTMENT (OUTPATIENT)
Dept: OBGYN | Facility: CLINIC | Age: 28
End: 2023-11-30
Payer: COMMERCIAL

## 2023-11-30 VITALS
HEIGHT: 61 IN | SYSTOLIC BLOOD PRESSURE: 131 MMHG | BODY MASS INDEX: 36.63 KG/M2 | DIASTOLIC BLOOD PRESSURE: 85 MMHG | HEART RATE: 134 BPM | WEIGHT: 194 LBS

## 2023-11-30 DIAGNOSIS — Z3A.34 34 WEEKS GESTATION OF PREGNANCY: ICD-10-CM

## 2023-11-30 PROCEDURE — 0502F SUBSEQUENT PRENATAL CARE: CPT

## 2023-11-30 PROCEDURE — 81002 URINALYSIS NONAUTO W/O SCOPE: CPT | Mod: NC

## 2023-12-04 LAB
BASOPHILS # BLD AUTO: 0.06 K/UL
BASOPHILS NFR BLD AUTO: 0.5 %
EOSINOPHIL # BLD AUTO: 0.23 K/UL
EOSINOPHIL NFR BLD AUTO: 2 %
GP B STREP DNA SPEC QL NAA+PROBE: NOT DETECTED
HCT VFR BLD CALC: 34 %
HGB BLD-MCNC: 11.7 G/DL
HIV1+2 AB SPEC QL IA.RAPID: NONREACTIVE
IMM GRANULOCYTES NFR BLD AUTO: 1.1 %
LYMPHOCYTES # BLD AUTO: 1.76 K/UL
LYMPHOCYTES NFR BLD AUTO: 14.9 %
MAN DIFF?: NORMAL
MCHC RBC-ENTMCNC: 28.1 PG
MCHC RBC-ENTMCNC: 34.4 GM/DL
MCV RBC AUTO: 81.5 FL
MONOCYTES # BLD AUTO: 0.81 K/UL
MONOCYTES NFR BLD AUTO: 6.9 %
NEUTROPHILS # BLD AUTO: 8.8 K/UL
NEUTROPHILS NFR BLD AUTO: 74.6 %
PLATELET # BLD AUTO: 276 K/UL
RBC # BLD: 4.17 M/UL
RBC # FLD: 13.9 %
SOURCE GBS: NORMAL
T PALLIDUM AB SER QL IA: NEGATIVE
WBC # FLD AUTO: 11.79 K/UL

## 2023-12-05 ENCOUNTER — NON-APPOINTMENT (OUTPATIENT)
Age: 28
End: 2023-12-05

## 2023-12-06 ENCOUNTER — APPOINTMENT (OUTPATIENT)
Dept: ANTEPARTUM | Facility: CLINIC | Age: 28
End: 2023-12-06
Payer: COMMERCIAL

## 2023-12-06 ENCOUNTER — ASOB RESULT (OUTPATIENT)
Age: 28
End: 2023-12-06

## 2023-12-06 PROCEDURE — 76818 FETAL BIOPHYS PROFILE W/NST: CPT

## 2023-12-06 PROCEDURE — 76821 MIDDLE CEREBRAL ARTERY ECHO: CPT

## 2023-12-07 ENCOUNTER — APPOINTMENT (OUTPATIENT)
Dept: OBGYN | Facility: CLINIC | Age: 28
End: 2023-12-07
Payer: COMMERCIAL

## 2023-12-07 VITALS
WEIGHT: 193 LBS | HEIGHT: 61 IN | DIASTOLIC BLOOD PRESSURE: 79 MMHG | SYSTOLIC BLOOD PRESSURE: 128 MMHG | BODY MASS INDEX: 36.44 KG/M2 | HEART RATE: 193 BPM

## 2023-12-07 DIAGNOSIS — Z11.59 ENCOUNTER FOR SCREENING FOR OTHER VIRAL DISEASES: ICD-10-CM

## 2023-12-07 DIAGNOSIS — Z3A.36 36 WEEKS GESTATION OF PREGNANCY: ICD-10-CM

## 2023-12-07 DIAGNOSIS — Z13.0 ENCOUNTER FOR SCREENING FOR DISEASES OF THE BLOOD AND BLOOD-FORMING ORGANS AND CERTAIN DISORDERS INVOLVING THE IMMUNE MECHANISM: ICD-10-CM

## 2023-12-07 DIAGNOSIS — Z36.85 ENCOUNTER FOR ANTENATAL SCREENING FOR STREPTOCOCCUS B: ICD-10-CM

## 2023-12-07 DIAGNOSIS — Z11.3 ENCOUNTER FOR SCREENING FOR INFECTIONS WITH A PREDOMINANTLY SEXUAL MODE OF TRANSMISSION: ICD-10-CM

## 2023-12-07 DIAGNOSIS — Z11.4 ENCOUNTER FOR SCREENING FOR HUMAN IMMUNODEFICIENCY VIRUS [HIV]: ICD-10-CM

## 2023-12-07 PROCEDURE — 81002 URINALYSIS NONAUTO W/O SCOPE: CPT | Mod: NC

## 2023-12-07 PROCEDURE — 0502F SUBSEQUENT PRENATAL CARE: CPT

## 2023-12-11 ENCOUNTER — NON-APPOINTMENT (OUTPATIENT)
Age: 28
End: 2023-12-11

## 2023-12-13 ENCOUNTER — APPOINTMENT (OUTPATIENT)
Dept: ANTEPARTUM | Facility: CLINIC | Age: 28
End: 2023-12-13
Payer: COMMERCIAL

## 2023-12-13 ENCOUNTER — ASOB RESULT (OUTPATIENT)
Age: 28
End: 2023-12-13

## 2023-12-13 PROCEDURE — 76816 OB US FOLLOW-UP PER FETUS: CPT

## 2023-12-14 ENCOUNTER — NON-APPOINTMENT (OUTPATIENT)
Age: 28
End: 2023-12-14

## 2023-12-14 ENCOUNTER — APPOINTMENT (OUTPATIENT)
Dept: OBGYN | Facility: CLINIC | Age: 28
End: 2023-12-14
Payer: COMMERCIAL

## 2023-12-14 VITALS
HEART RATE: 101 BPM | WEIGHT: 191 LBS | HEIGHT: 61 IN | BODY MASS INDEX: 36.06 KG/M2 | DIASTOLIC BLOOD PRESSURE: 78 MMHG | SYSTOLIC BLOOD PRESSURE: 126 MMHG

## 2023-12-14 DIAGNOSIS — O40.9XX0 POLYHYDRAMNIOS, UNSPECIFIED TRIMESTER, NOT APPLICABLE OR UNSPECIFIED: ICD-10-CM

## 2023-12-14 DIAGNOSIS — Z3A.37 37 WEEKS GESTATION OF PREGNANCY: ICD-10-CM

## 2023-12-14 PROCEDURE — 0502F SUBSEQUENT PRENATAL CARE: CPT

## 2023-12-19 ENCOUNTER — OUTPATIENT (OUTPATIENT)
Dept: INPATIENT UNIT | Facility: HOSPITAL | Age: 28
LOS: 1 days | End: 2023-12-19
Payer: COMMERCIAL

## 2023-12-19 ENCOUNTER — APPOINTMENT (OUTPATIENT)
Dept: OBGYN | Facility: CLINIC | Age: 28
End: 2023-12-19
Payer: COMMERCIAL

## 2023-12-19 VITALS
BODY MASS INDEX: 36.47 KG/M2 | HEART RATE: 103 BPM | DIASTOLIC BLOOD PRESSURE: 89 MMHG | WEIGHT: 193 LBS | SYSTOLIC BLOOD PRESSURE: 122 MMHG

## 2023-12-19 VITALS — TEMPERATURE: 98 F

## 2023-12-19 VITALS — DIASTOLIC BLOOD PRESSURE: 82 MMHG | HEART RATE: 113 BPM | SYSTOLIC BLOOD PRESSURE: 125 MMHG

## 2023-12-19 DIAGNOSIS — O26.899 OTHER SPECIFIED PREGNANCY RELATED CONDITIONS, UNSPECIFIED TRIMESTER: ICD-10-CM

## 2023-12-19 PROCEDURE — 83986 ASSAY PH BODY FLUID NOS: CPT

## 2023-12-19 PROCEDURE — 59025 FETAL NON-STRESS TEST: CPT | Mod: 26

## 2023-12-19 PROCEDURE — 99212 OFFICE O/P EST SF 10 MIN: CPT | Mod: 25,GC

## 2023-12-19 PROCEDURE — G0463: CPT

## 2023-12-19 PROCEDURE — 59025 FETAL NON-STRESS TEST: CPT

## 2023-12-19 PROCEDURE — 84112 EVAL AMNIOTIC FLUID PROTEIN: CPT

## 2023-12-19 PROCEDURE — 0502F SUBSEQUENT PRENATAL CARE: CPT

## 2023-12-19 PROCEDURE — 81002 URINALYSIS NONAUTO W/O SCOPE: CPT | Mod: NC

## 2023-12-19 NOTE — OB PROVIDER TRIAGE NOTE - HISTORY OF PRESENT ILLNESS
GIOVANNY CORTÉS is a 28y  at  [place] who presents to L&D for possible rupture of membranes. She felt a gush of odorless, colorless fluid at around 0800 this morning and then continued to feel small gushes of fluid throughout the day. She endorses no contractions but does say she has intermittent back pains.   Pt denies vaginal bleeding, contractions. She endorses good fetal movement.     Pregnancy course is significant for:  polyhydramnios that resolved within the last 2 weeks. COLLINS 26 > 14 last week    POB: SAB in  without D&C  PGYN: -fibroids/-cysts, denied STD hx, denies abnormal PAPs. cervical polypectomy   PMH: denies  PSH: cervical polypectomy 2015, breast reduction, rhinoplasty  SH: Denies tobacco use, EtOH use and illicit drug use during the pregnancy; Feels safe at home  Meds: PNV  All: NKDA

## 2023-12-19 NOTE — OB PROVIDER TRIAGE NOTE - NSHPPHYSICALEXAM_GEN_ALL_CORE
T(C): 36.7 (12-19-23 @ 15:56), Max: 36.7 (12-19-23 @ 15:56)  HR: 113 (12-19-23 @ 15:41) (113 - 113)  BP: 125/82 (12-19-23 @ 15:41) (125/82 - 125/82)    Gen: NAD, well-appearing  Abd: soft, gravid  Ext: non-edematous, non-tender   SVE: deferred  SSE: cervix visualized, closed and without any signs of bleeding or drainage, no pooling. Nitrazine negative, amnisure negative.   FHT: baseline 110, moderate variability, +accels, -decels   [place]: no ctx seen T(C): 36.7 (12-19-23 @ 15:56), Max: 36.7 (12-19-23 @ 15:56)  HR: 113 (12-19-23 @ 15:41) (113 - 113)  BP: 125/82 (12-19-23 @ 15:41) (125/82 - 125/82)    Gen: NAD, well-appearing  Abd: soft, gravid  Ext: non-edematous, non-tender   SVE: deferred  SSE: cervix visualized, closed and without any signs of bleeding or drainage, no pooling. Nitrazine negative, amnisure negative.   FHT: baseline 110, moderate variability, +accels, -decels   [place]: no ctx seen

## 2023-12-19 NOTE — OB PROVIDER TRIAGE NOTE - ATTENDING COMMENTS
patient presents for evaluation of rupture of membranes   ROM ruled out, found not to be in labor  NST: reactive  patient stable for discharge home with labor precautions

## 2023-12-19 NOTE — OB PROVIDER TRIAGE NOTE - NS_OBACUITYLEVEL_OBGYN_ALL_OB
Patient discharged at this time; AOx3 at time of discharge.Pt verbalized understanding of discharge instructions. Encouraged questions; no questions at this time. Pt ambulated to lobby with no incident.  FELICIA Dave.     2

## 2023-12-19 NOTE — OB PROVIDER TRIAGE NOTE - NSOBPROVIDERNOTE_OBGYN_ALL_OB_FT
A/P: 28y  at  [place] who presents to L&D for possible rupture of membranes.  COLLINS: ***  No pooling, neg nitrazine, neg amnisure. Unlikely to be SROM  Fetus: reactive, reassuring  [place]: no ctx seen  Dispo: Stable for d/c home with return precautions. Discussed making an appointment with Dr GIPSON this week or early next week    Discussed with Dr. Sanchez and Dr. Martinez A/P: 28y  at  [place] who presents to L&D for possible rupture of membranes.  COLLINS: ***  No pooling, neg nitrazine, neg amnisure. Unlikely to be SROM  Fetus: reactive, reassuring  [place]: no ctx seen  Dispo: Stable for d/c home with return precautions. Discussed making an appointment with Dr GIPSON this week or early next week    Discussed with Dr. Sanchez and Dr. Martinez A/P: 28y  at  [place] who presents to L&D for possible rupture of membranes.  COLLINS: 20.3  No pooling, neg nitrazine, neg amnisure. Unlikely to be SROM  Fetus: reactive, reassuring  [place]: no ctx seen  Dispo: Stable for d/c home with return precautions. Discussed making an appointment with Dr GIPSON this week or early next week    Discussed with Dr. Sanchez and Dr. Martinez A/P: 28y  at  [place] who presents to L&D for possible rupture of membranes.  COLLINS: 20.3  No pooling, neg nitrazine, neg amnisure. Unlikely to be SROM  Fetus: reactive, reassuring  [place]: no ctx seen  Dispo: Stable for d/c home with return precautions. Discussed making an appointment with Dr GIPSON this week or early next week    Discussed with Dr. Sanchez and Dr. Martinez

## 2023-12-19 NOTE — OB RN TRIAGE NOTE - FALL HARM RISK - UNIVERSAL INTERVENTIONS
Bed in lowest position, wheels locked, appropriate side rails in place/Call bell, personal items and telephone in reach/Instruct patient to call for assistance before getting out of bed or chair/Non-slip footwear when patient is out of bed/[place] to call system/Physically safe environment - no spills, clutter or unnecessary equipment/Purposeful Proactive Rounding/Room/bathroom lighting operational, light cord in reach Bed in lowest position, wheels locked, appropriate side rails in place/Call bell, personal items and telephone in reach/Instruct patient to call for assistance before getting out of bed or chair/Non-slip footwear when patient is out of bed/[place] to call system/Physically safe environment - no spills, clutter or unnecessary equipment/Purposeful Proactive Rounding/Room/bathroom lighting operational, light cord in reach

## 2023-12-20 ENCOUNTER — APPOINTMENT (OUTPATIENT)
Dept: ANTEPARTUM | Facility: CLINIC | Age: 28
End: 2023-12-20

## 2023-12-21 ENCOUNTER — NON-APPOINTMENT (OUTPATIENT)
Age: 28
End: 2023-12-21

## 2023-12-21 ENCOUNTER — APPOINTMENT (OUTPATIENT)
Dept: OBGYN | Facility: CLINIC | Age: 28
End: 2023-12-21
Payer: COMMERCIAL

## 2023-12-21 VITALS
DIASTOLIC BLOOD PRESSURE: 87 MMHG | HEIGHT: 61 IN | WEIGHT: 192 LBS | SYSTOLIC BLOOD PRESSURE: 135 MMHG | BODY MASS INDEX: 36.25 KG/M2 | HEART RATE: 112 BPM

## 2023-12-21 DIAGNOSIS — O09.293 SUPERVISION OF PREGNANCY WITH OTHER POOR REPRODUCTIVE OR OBSTETRIC HISTORY, THIRD TRIMESTER: ICD-10-CM

## 2023-12-21 DIAGNOSIS — M54.9 DORSALGIA, UNSPECIFIED: ICD-10-CM

## 2023-12-21 DIAGNOSIS — Z3A.38 38 WEEKS GESTATION OF PREGNANCY: ICD-10-CM

## 2023-12-21 DIAGNOSIS — Z03.71 ENCOUNTER FOR SUSPECTED PROBLEM WITH AMNIOTIC CAVITY AND MEMBRANE RULED OUT: ICD-10-CM

## 2023-12-21 DIAGNOSIS — O99.891 OTHER SPECIFIED DISEASES AND CONDITIONS COMPLICATING PREGNANCY: ICD-10-CM

## 2023-12-21 PROCEDURE — 81002 URINALYSIS NONAUTO W/O SCOPE: CPT | Mod: NC

## 2023-12-21 PROCEDURE — 0502F SUBSEQUENT PRENATAL CARE: CPT

## 2023-12-22 ENCOUNTER — TRANSCRIPTION ENCOUNTER (OUTPATIENT)
Age: 28
End: 2023-12-22

## 2023-12-22 PROCEDURE — 59426 ANTEPARTUM CARE ONLY: CPT

## 2023-12-23 ENCOUNTER — INPATIENT (INPATIENT)
Facility: HOSPITAL | Age: 28
LOS: 1 days | Discharge: HOME | End: 2023-12-25
Attending: OBSTETRICS & GYNECOLOGY | Admitting: OBSTETRICS & GYNECOLOGY
Payer: COMMERCIAL

## 2023-12-23 ENCOUNTER — APPOINTMENT (OUTPATIENT)
Dept: OBGYN | Facility: HOSPITAL | Age: 28
End: 2023-12-23

## 2023-12-23 ENCOUNTER — NON-APPOINTMENT (OUTPATIENT)
Age: 28
End: 2023-12-23

## 2023-12-23 VITALS — SYSTOLIC BLOOD PRESSURE: 122 MMHG | HEART RATE: 106 BPM | TEMPERATURE: 98 F | DIASTOLIC BLOOD PRESSURE: 74 MMHG

## 2023-12-23 LAB
BASOPHILS # BLD AUTO: 0.08 K/UL — SIGNIFICANT CHANGE UP (ref 0–0.2)
BASOPHILS # BLD AUTO: 0.08 K/UL — SIGNIFICANT CHANGE UP (ref 0–0.2)
BASOPHILS NFR BLD AUTO: 0.6 % — SIGNIFICANT CHANGE UP (ref 0–2)
BASOPHILS NFR BLD AUTO: 0.6 % — SIGNIFICANT CHANGE UP (ref 0–2)
BLD GP AB SCN SERPL QL: SIGNIFICANT CHANGE UP
BLD GP AB SCN SERPL QL: SIGNIFICANT CHANGE UP
EOSINOPHIL # BLD AUTO: 0.21 K/UL — SIGNIFICANT CHANGE UP (ref 0–0.5)
EOSINOPHIL # BLD AUTO: 0.21 K/UL — SIGNIFICANT CHANGE UP (ref 0–0.5)
EOSINOPHIL NFR BLD AUTO: 1.6 % — SIGNIFICANT CHANGE UP (ref 0–6)
EOSINOPHIL NFR BLD AUTO: 1.6 % — SIGNIFICANT CHANGE UP (ref 0–6)
HCT VFR BLD CALC: 35.6 % — SIGNIFICANT CHANGE UP (ref 34.5–45)
HCT VFR BLD CALC: 35.6 % — SIGNIFICANT CHANGE UP (ref 34.5–45)
HGB BLD-MCNC: 12 G/DL — SIGNIFICANT CHANGE UP (ref 11.5–15.5)
HGB BLD-MCNC: 12 G/DL — SIGNIFICANT CHANGE UP (ref 11.5–15.5)
IMM GRANULOCYTES NFR BLD AUTO: 1.1 % — HIGH (ref 0–0.9)
IMM GRANULOCYTES NFR BLD AUTO: 1.1 % — HIGH (ref 0–0.9)
LYMPHOCYTES # BLD AUTO: 18.5 % — SIGNIFICANT CHANGE UP (ref 13–44)
LYMPHOCYTES # BLD AUTO: 18.5 % — SIGNIFICANT CHANGE UP (ref 13–44)
LYMPHOCYTES # BLD AUTO: 2.36 K/UL — SIGNIFICANT CHANGE UP (ref 1–3.3)
LYMPHOCYTES # BLD AUTO: 2.36 K/UL — SIGNIFICANT CHANGE UP (ref 1–3.3)
MCHC RBC-ENTMCNC: 26.4 PG — LOW (ref 27–34)
MCHC RBC-ENTMCNC: 26.4 PG — LOW (ref 27–34)
MCHC RBC-ENTMCNC: 33.7 GM/DL — SIGNIFICANT CHANGE UP (ref 32–36)
MCHC RBC-ENTMCNC: 33.7 GM/DL — SIGNIFICANT CHANGE UP (ref 32–36)
MCV RBC AUTO: 78.2 FL — LOW (ref 80–100)
MCV RBC AUTO: 78.2 FL — LOW (ref 80–100)
MONOCYTES # BLD AUTO: 1.13 K/UL — HIGH (ref 0–0.9)
MONOCYTES # BLD AUTO: 1.13 K/UL — HIGH (ref 0–0.9)
MONOCYTES NFR BLD AUTO: 8.8 % — SIGNIFICANT CHANGE UP (ref 2–14)
MONOCYTES NFR BLD AUTO: 8.8 % — SIGNIFICANT CHANGE UP (ref 2–14)
NEUTROPHILS # BLD AUTO: 8.86 K/UL — HIGH (ref 1.8–7.4)
NEUTROPHILS # BLD AUTO: 8.86 K/UL — HIGH (ref 1.8–7.4)
NEUTROPHILS NFR BLD AUTO: 69.4 % — SIGNIFICANT CHANGE UP (ref 43–77)
NEUTROPHILS NFR BLD AUTO: 69.4 % — SIGNIFICANT CHANGE UP (ref 43–77)
PLATELET # BLD AUTO: 314 K/UL — SIGNIFICANT CHANGE UP (ref 150–400)
PLATELET # BLD AUTO: 314 K/UL — SIGNIFICANT CHANGE UP (ref 150–400)
RBC # BLD: 4.55 M/UL — SIGNIFICANT CHANGE UP (ref 3.8–5.2)
RBC # BLD: 4.55 M/UL — SIGNIFICANT CHANGE UP (ref 3.8–5.2)
RBC # FLD: 14.3 % — SIGNIFICANT CHANGE UP (ref 10.3–14.5)
RBC # FLD: 14.3 % — SIGNIFICANT CHANGE UP (ref 10.3–14.5)
T PALLIDUM AB TITR SER: NEGATIVE — SIGNIFICANT CHANGE UP
T PALLIDUM AB TITR SER: NEGATIVE — SIGNIFICANT CHANGE UP
WBC # BLD: 12.78 K/UL — HIGH (ref 3.8–10.5)
WBC # BLD: 12.78 K/UL — HIGH (ref 3.8–10.5)
WBC # FLD AUTO: 12.78 K/UL — HIGH (ref 3.8–10.5)
WBC # FLD AUTO: 12.78 K/UL — HIGH (ref 3.8–10.5)

## 2023-12-23 PROCEDURE — 59025 FETAL NON-STRESS TEST: CPT | Mod: 26

## 2023-12-23 PROCEDURE — 59515 CESAREAN DELIVERY: CPT

## 2023-12-23 RX ORDER — SIMETHICONE 80 MG/1
80 TABLET, CHEWABLE ORAL EVERY 4 HOURS
Refills: 0 | Status: DISCONTINUED | OUTPATIENT
Start: 2023-12-23 | End: 2023-12-25

## 2023-12-23 RX ORDER — LANOLIN
1 OINTMENT (GRAM) TOPICAL EVERY 6 HOURS
Refills: 0 | Status: DISCONTINUED | OUTPATIENT
Start: 2023-12-23 | End: 2023-12-25

## 2023-12-23 RX ORDER — ACETAMINOPHEN 500 MG
975 TABLET ORAL
Refills: 0 | Status: DISCONTINUED | OUTPATIENT
Start: 2023-12-23 | End: 2023-12-25

## 2023-12-23 RX ORDER — SODIUM CHLORIDE 9 MG/ML
1000 INJECTION, SOLUTION INTRAVENOUS
Refills: 0 | Status: DISCONTINUED | OUTPATIENT
Start: 2023-12-23 | End: 2023-12-25

## 2023-12-23 RX ORDER — MAGNESIUM HYDROXIDE 400 MG/1
30 TABLET, CHEWABLE ORAL
Refills: 0 | Status: DISCONTINUED | OUTPATIENT
Start: 2023-12-23 | End: 2023-12-25

## 2023-12-23 RX ORDER — CEFAZOLIN SODIUM 1 G
2000 VIAL (EA) INJECTION ONCE
Refills: 0 | Status: COMPLETED | OUTPATIENT
Start: 2023-12-23 | End: 2023-12-23

## 2023-12-23 RX ORDER — OXYCODONE HYDROCHLORIDE 5 MG/1
5 TABLET ORAL
Refills: 0 | Status: DISCONTINUED | OUTPATIENT
Start: 2023-12-23 | End: 2023-12-25

## 2023-12-23 RX ORDER — IBUPROFEN 200 MG
600 TABLET ORAL EVERY 6 HOURS
Refills: 0 | Status: COMPLETED | OUTPATIENT
Start: 2023-12-23 | End: 2024-11-20

## 2023-12-23 RX ORDER — OXYTOCIN 10 UNIT/ML
333.33 VIAL (ML) INJECTION
Qty: 20 | Refills: 0 | Status: DISCONTINUED | OUTPATIENT
Start: 2023-12-23 | End: 2023-12-25

## 2023-12-23 RX ORDER — ONDANSETRON 8 MG/1
4 TABLET, FILM COATED ORAL ONCE
Refills: 0 | Status: COMPLETED | OUTPATIENT
Start: 2023-12-23 | End: 2023-12-23

## 2023-12-23 RX ORDER — KETOROLAC TROMETHAMINE 30 MG/ML
30 SYRINGE (ML) INJECTION EVERY 6 HOURS
Refills: 0 | Status: DISCONTINUED | OUTPATIENT
Start: 2023-12-23 | End: 2023-12-24

## 2023-12-23 RX ORDER — CITRIC ACID/SODIUM CITRATE 300-500 MG
30 SOLUTION, ORAL ORAL ONCE
Refills: 0 | Status: COMPLETED | OUTPATIENT
Start: 2023-12-23 | End: 2023-12-23

## 2023-12-23 RX ORDER — CHLORHEXIDINE GLUCONATE 213 G/1000ML
1 SOLUTION TOPICAL DAILY
Refills: 0 | Status: DISCONTINUED | OUTPATIENT
Start: 2023-12-23 | End: 2023-12-23

## 2023-12-23 RX ORDER — FAMOTIDINE 10 MG/ML
20 INJECTION INTRAVENOUS ONCE
Refills: 0 | Status: COMPLETED | OUTPATIENT
Start: 2023-12-23 | End: 2023-12-23

## 2023-12-23 RX ORDER — SCOPALAMINE 1 MG/3D
1 PATCH, EXTENDED RELEASE TRANSDERMAL ONCE
Refills: 0 | Status: COMPLETED | OUTPATIENT
Start: 2023-12-23 | End: 2023-12-23

## 2023-12-23 RX ORDER — TETANUS TOXOID, REDUCED DIPHTHERIA TOXOID AND ACELLULAR PERTUSSIS VACCINE, ADSORBED 5; 2.5; 8; 8; 2.5 [IU]/.5ML; [IU]/.5ML; UG/.5ML; UG/.5ML; UG/.5ML
0.5 SUSPENSION INTRAMUSCULAR ONCE
Refills: 0 | Status: COMPLETED | OUTPATIENT
Start: 2023-12-23 | End: 2023-12-23

## 2023-12-23 RX ORDER — TETANUS TOXOID, REDUCED DIPHTHERIA TOXOID AND ACELLULAR PERTUSSIS VACCINE, ADSORBED 5; 2.5; 8; 8; 2.5 [IU]/.5ML; [IU]/.5ML; UG/.5ML; UG/.5ML; UG/.5ML
0.5 SUSPENSION INTRAMUSCULAR ONCE
Refills: 0 | Status: COMPLETED | OUTPATIENT
Start: 2023-12-23

## 2023-12-23 RX ORDER — DIPHENHYDRAMINE HCL 50 MG
25 CAPSULE ORAL EVERY 6 HOURS
Refills: 0 | Status: DISCONTINUED | OUTPATIENT
Start: 2023-12-23 | End: 2023-12-25

## 2023-12-23 RX ORDER — INFLUENZA VIRUS VACCINE 15; 15; 15; 15 UG/.5ML; UG/.5ML; UG/.5ML; UG/.5ML
0.5 SUSPENSION INTRAMUSCULAR ONCE
Refills: 0 | Status: COMPLETED | OUTPATIENT
Start: 2023-12-23 | End: 2023-12-23

## 2023-12-23 RX ORDER — SODIUM CHLORIDE 9 MG/ML
1000 INJECTION, SOLUTION INTRAVENOUS
Refills: 0 | Status: DISCONTINUED | OUTPATIENT
Start: 2023-12-23 | End: 2023-12-23

## 2023-12-23 RX ORDER — OXYCODONE HYDROCHLORIDE 5 MG/1
5 TABLET ORAL ONCE
Refills: 0 | Status: DISCONTINUED | OUTPATIENT
Start: 2023-12-23 | End: 2023-12-25

## 2023-12-23 RX ORDER — ACETAMINOPHEN 500 MG
975 TABLET ORAL ONCE
Refills: 0 | Status: COMPLETED | OUTPATIENT
Start: 2023-12-23 | End: 2023-12-23

## 2023-12-23 RX ORDER — SODIUM CHLORIDE 9 MG/ML
1000 INJECTION, SOLUTION INTRAVENOUS ONCE
Refills: 0 | Status: COMPLETED | OUTPATIENT
Start: 2023-12-23 | End: 2023-12-23

## 2023-12-23 RX ORDER — ENOXAPARIN SODIUM 100 MG/ML
40 INJECTION SUBCUTANEOUS EVERY 24 HOURS
Refills: 0 | Status: DISCONTINUED | OUTPATIENT
Start: 2023-12-23 | End: 2023-12-25

## 2023-12-23 RX ADMIN — SCOPALAMINE 1 PATCH: 1 PATCH, EXTENDED RELEASE TRANSDERMAL at 06:36

## 2023-12-23 RX ADMIN — Medication 975 MILLIGRAM(S): at 06:37

## 2023-12-23 RX ADMIN — SODIUM CHLORIDE 125 MILLILITER(S): 9 INJECTION, SOLUTION INTRAVENOUS at 06:45

## 2023-12-23 RX ADMIN — INFLUENZA VIRUS VACCINE 0.5 MILLILITER(S): 15; 15; 15; 15 SUSPENSION INTRAMUSCULAR at 23:43

## 2023-12-23 RX ADMIN — Medication 30 MILLIGRAM(S): at 09:24

## 2023-12-23 RX ADMIN — CHLORHEXIDINE GLUCONATE 1 APPLICATION(S): 213 SOLUTION TOPICAL at 07:40

## 2023-12-23 RX ADMIN — Medication 975 MILLIGRAM(S): at 14:26

## 2023-12-23 RX ADMIN — ONDANSETRON 4 MILLIGRAM(S): 8 TABLET, FILM COATED ORAL at 12:22

## 2023-12-23 RX ADMIN — FAMOTIDINE 20 MILLIGRAM(S): 10 INJECTION INTRAVENOUS at 07:52

## 2023-12-23 RX ADMIN — Medication 975 MILLIGRAM(S): at 07:10

## 2023-12-23 RX ADMIN — Medication 2000 MILLIGRAM(S): at 09:08

## 2023-12-23 RX ADMIN — ENOXAPARIN SODIUM 40 MILLIGRAM(S): 100 INJECTION SUBCUTANEOUS at 21:04

## 2023-12-23 RX ADMIN — TETANUS TOXOID, REDUCED DIPHTHERIA TOXOID AND ACELLULAR PERTUSSIS VACCINE, ADSORBED 0.5 MILLILITER(S): 5; 2.5; 8; 8; 2.5 SUSPENSION INTRAMUSCULAR at 21:02

## 2023-12-23 RX ADMIN — Medication 975 MILLIGRAM(S): at 21:04

## 2023-12-23 RX ADMIN — Medication 1000 MILLIUNIT(S)/MIN: at 09:24

## 2023-12-23 RX ADMIN — Medication 30 MILLIGRAM(S): at 23:42

## 2023-12-23 RX ADMIN — SODIUM CHLORIDE 1000 MILLILITER(S): 9 INJECTION, SOLUTION INTRAVENOUS at 07:48

## 2023-12-23 RX ADMIN — Medication 30 MILLILITER(S): at 07:36

## 2023-12-23 RX ADMIN — Medication 30 MILLIGRAM(S): at 17:28

## 2023-12-23 NOTE — OB PROVIDER DELIVERY SUMMARY - NSOBVTERISKASSESS_OBGYN_ALL_OB_FT
OBPostPartum Assessment Completed on: 23-Dec-2023 08:55 OBPostPartum Assessment Completed on: 23-Dec-2023 09:57

## 2023-12-23 NOTE — OB RN INTRAOPERATIVE NOTE - NS_ANESTHESIAEND_OBGYN_ALL_OB_DT
Oncology Rooming Note    November 5, 2020 2:57 PM   Byron Russo is a 56 year old male who presents for:    Chief Complaint   Patient presents with     Lab Only     venipuncture, vitals checked     Oncology Clinic Visit     Patient with MDS here for provider visit and lab review      Initial Vitals: /85   Pulse 101   Temp 97.9  F (36.6  C)   Resp 20   Wt 98.4 kg (217 lb)   SpO2 93%   BMI 29.43 kg/m   Estimated body mass index is 29.43 kg/m  as calculated from the following:    Height as of 10/22/20: 1.829 m (6').    Weight as of this encounter: 98.4 kg (217 lb). Body surface area is 2.24 meters squared.  No Pain (0) Comment: Data Unavailable   No LMP for male patient.  Allergies reviewed: Yes  Medications reviewed: Yes    Medications: Medication refills not needed today.  Pharmacy name entered into EPIC: [facility] [place], [place] - [address] [place] [address] [phone]    Clinical concerns:       Johanny Moser CMA                 23-Dec-2023 10:05

## 2023-12-23 NOTE — OB NEONATOLOGY/PEDIATRICIAN DELIVERY SUMMARY - NSLABSCHECK_OBGYN_ALL_OB
"Creatinine 4.7 on admit, baseline around 2.2    Jian Arrieta is a 67M w/ MARIE (liver bx 3/2019 w/o cirrhosis), s/p sleeve gastrectomy, CAD s/p CABG, CKD3 (BL creatinine 2.2), and history of obstructive nephrolithiasis who presents from nephrology clinic w/ worsening renal function x 2 weeks. Recent admission to  w/ nephrolithiasis of R kidney. Was discharged from  w/ doxycycline out of concern for pyelonephritis. In clinic, creatinine was noted to be significantly elevated to 4.7 prompting ED referral. On arrival, reported improving but persistent flank pain. Denies any subjective fevers/chills/N/V. Denies changes in urine output, dysuria, or hematuria. Of note, blood glucose 516 on arrival. Nephrology consulted for "acute on chronic kidney failure."    Lab Results   Component Value Date    CREATININE 4.5 (H) 10/13/2023     Estimated Creatinine Clearance: 19.8 mL/min (A) (based on SCr of 4.5 mg/dL (H)).    CT A/P:   "- Mild right hydronephrosis and right hydroureter with gradual tapering without evidence for ureteral calculus, these findings may relate to sequelae of recently passed calculus, clinical and historical correlation is needed.   - Mild perinephric stranding bilaterally, nonspecific however correlation for UTI/pyelonephritis is needed."    TTE with EF 50-55%, normal diastolic function, no evidence of pHTN, CVP 3.    UA: 3+ protein, 4+ glucose, nitrite (-), RBC 15, WBC 11, rare bacteria  Urine chloride 80  Urine creatinine 36.0  Urine microalbumin >5k  Urine protein 781  UPCR 4.29  Urine Na 81    Etiology of Acute component superimposed on CKD3: Suspect etiology is multifactorial including ongoing bilateral pyelonephritis and hypertensive emergency in the setting of chronic kidney disease.    Plan:  - Trend RFP daily  - F/u retroperitoneal ultrasound  - Strict I&Os and daily weights   - considered gentle IVF but c/f possible volume overload   - Avoid nephrotoxic agents such as NSAIDs, gadolinium and " IV radiocontrast  - Renally dose meds to current GFR  - Maintain MAP > 65  - f/u renin, aldosterone, aldosterone/renin ratio.       Yes

## 2023-12-23 NOTE — OB PROVIDER H&P - HISTORY OF PRESENT ILLNESS
GIOVANNY CORTÉS is a 28y  at 39w2d [place] who presents to L&D for primary elective  section.  Pt denies vaginal bleeding, contractions. She endorses good fetal movement.     Pregnancy course is significant for:  polyhydramnios that resolved     POB: SAB in 2020 without D&C  PGYN: -fibroids/-cysts, denied STD hx, denies abnormal PAPs. cervical polypectomy   PMH: denies  PSH: cervical polypectomy , breast reduction, rhinoplasty  SH: Denies tobacco use, EtOH use and illicit drug use during the pregnancy; Feels safe at home  Meds: PNV  All: NKDA    BMI: 36.3  Sono: vertex, anterior placenta  EFW: 3500g

## 2023-12-23 NOTE — OB RN DELIVERY SUMMARY - NS_SEPSISRSKCALC_OBGYN_ALL_OB_FT
EOS calculated successfully. EOS Risk Factor: 0.5/1000 live births ([facility] national incidence); GA=39w2d; Temp=98.24; ROM=0.033; GBS='Negative'; Antibiotics='No antibiotics or any antibiotics < 2 hrs prior to birth'   EOS calculated successfully. EOS Risk Factor: 0.5/1000 live births ([facility] national incidence); GA=39w2d; Temp=98.24; ROM=0.033; GBS='Negative'; Antibiotics='No antibiotics or any antibiotics < 2 hrs prior to birth'

## 2023-12-23 NOTE — OB RN PATIENT PROFILE - FALL HARM RISK - UNIVERSAL INTERVENTIONS
Bed in lowest position, wheels locked, appropriate side rails in place/Call bell, personal items and telephone in reach/Instruct patient to call for assistance before getting out of bed or chair/Non-slip footwear when patient is out of bed/[place] to call system/Physically safe environment - no spills, clutter or unnecessary equipment/Purposeful Proactive Rounding/Room/bathroom lighting operational, light cord in reach Bed in lowest position, wheels locked, appropriate side rails in place/Call bell, personal items and telephone in reach/Instruct patient to call for assistance before getting out of bed or chair/Non-slip footwear when patient is out of bed/[place] to call system/Physically safe environment - no spills, clutter or unnecessary equipment/Purposeful Proactive Rounding/Room/bathroom lighting operational, light cord in reach

## 2023-12-23 NOTE — OB RN PATIENT PROFILE - NS PRO DEPRESSION SCREENING Y/N6
Addended by: FABRIZIO GREEN on: 12/7/2022 01:02 PM     Modules accepted: [place], SmartSet    
Addended by: ILIANA PRAKASH on: 11/28/2022 02:17 PM     Modules accepted: Level of Service    
no

## 2023-12-23 NOTE — OB PROVIDER H&P - NSHPPHYSICALEXAM_GEN_ALL_CORE
T(C): 36.8 (12-23-23 @ 05:46), Max: 36.8 (12-23-23 @ 05:45)  HR: 106 (12-23-23 @ 05:46) (106 - 106)  BP: 122/74 (12-23-23 @ 05:46) (122/74 - 122/74)  RR: 16 (12-23-23 @ 05:46) (16 - 16)    Gen: NAD, well-appearing, AAOx3   Abd: Soft, gravid  Ext: non-tender, non-edematous  SSE: deferred  SVE:  0/50/-3  Bedside sono: vertex  FHT: baseline 130, moderate variability, +accels, -decels   [place]: no ctx seen T(C): 36.8 (12-23-23 @ 05:46), Max: 36.8 (12-23-23 @ 05:45)  HR: 106 (12-23-23 @ 05:46) (106 - 106)  BP: 122/74 (12-23-23 @ 05:46) (122/74 - 122/74)  RR: 16 (12-23-23 @ 05:46) (16 - 16)    Gen: NAD, well-appearing, AAOx3   Abd: Soft, gravid  Ext: non-tender, non-edematous  SSE: deferred  SVE:  0/50/-3  Bedside sono: vertex  FHT: baseline 130, moderate variability, +accels, -decels   [place]: no ctx seen

## 2023-12-23 NOTE — OB RN INTRAOPERATIVE NOTE - NSSELHIDDEN_OBGYN_ALL_OB_FT
[NS_DeliveryAttending1_OBGYN_ALL_OB_FT:[id number]==],[NS_DeliveryAssist1_OBGYN_ALL_OB_FT:[id number]=],[NS_DeliveryRN_OBGYN_ALL_OB_FT:[id number]=] [NS_DeliveryAttending1_OBGYN_ALL_OB_FT:[id number]==],[NS_DeliveryAssist1_OBGYN_ALL_OB_FT:[id number]=],[NS_DeliveryRN_OBGYN_ALL_OB_FT:[id number]=] [NS_DeliveryAttending1_OBGYN_ALL_OB_FT:[id number]==],[NS_DeliveryAssist1_OBGYN_ALL_OB_FT:[id number]=],[NS_DeliveryRN_OBGYN_ALL_OB_FT:[id number]=],[NS_DeliveryAssist2_OBGYN_ALL_OB_FT:[id number]=] [NS_DeliveryAttending1_OBGYN_ALL_OB_FT:[id number]==],[NS_DeliveryAssist1_OBGYN_ALL_OB_FT:[id number]=],[NS_DeliveryRN_OBGYN_ALL_OB_FT:[id number]=],[NS_DeliveryAssist2_OBGYN_ALL_OB_FT:[id number]=]

## 2023-12-23 NOTE — OB PROVIDER DELIVERY SUMMARY - NSSELHIDDEN_OBGYN_ALL_OB_FT
[NS_DeliveryAttending1_OBGYN_ALL_OB_FT:[id number]==],[NS_DeliveryAssist1_OBGYN_ALL_OB_FT:[id number]=],[NS_DeliveryRN_OBGYN_ALL_OB_FT:[id number]=],[NS_DeliveryAssist2_OBGYN_ALL_OB_FT:[id number]=] [NS_DeliveryAttending1_OBGYN_ALL_OB_FT:[id number]==],[NS_DeliveryAssist1_OBGYN_ALL_OB_FT:[id number]=],[NS_DeliveryRN_OBGYN_ALL_OB_FT:[id number]=],[NS_DeliveryAssist2_OBGYN_ALL_OB_FT:[id number]=]

## 2023-12-23 NOTE — OB PROVIDER H&P - NSLOWPPHRISK_OBGYN_A_OB
No previous uterine incision/Felix Pregnancy/Less than or equal to 4 previous vaginal births/No known bleeding disorder/No history of postpartum hemorrhage/No other PPH risks indicated

## 2023-12-23 NOTE — OB RN DELIVERY SUMMARY - BABY A: APGAR 1 MIN HEART RATE, DELIVERY
Consent 1/Introductory Paragraph: The rationale for Mohs was explained to the patient and consent was obtained. The risks, benefits and alternatives to therapy were discussed in detail. Specifically, the risks of infection, scarring, bleeding, prolonged wound healing, incomplete removal, allergy to anesthesia, nerve injury and recurrence were addressed. Prior to the procedure, the treatment site was clearly identified and confirmed by the patient. All components of Universal Protocol/PAUSE Rule completed. (2) more than 100 beats/min

## 2023-12-23 NOTE — OB PROVIDER H&P - ASSESSMENT
A/P: GIOVANNY CORTÉS is a 28y  at 39w2d GA who presents to L&D for primary elective  section.  -Admit to L&D  -Consent  -Admission labs  -NPO, except ice chips   -IV fluids  -Primary elective c/s: Plan for OR  -Fetus: Cat I tracing. Continuous toco and fetal monitoring.   -GBS: Negative, no GBS ppx required   -Analgesia: spinal in OR    Discussed with Dr. Sanchez

## 2023-12-23 NOTE — OB RN INTRAOPERATIVE NOTE - NS_CULTURES_OBGYN_ALL_OB
"Encounter Date: 10/6/2022    SCRIBE #1 NOTE: I Sophia Vanessa, am scribing for, and in the presence of,  Crista Ivey PA-C.     History     Chief Complaint   Patient presents with    Cyst     Pt reports "knot" to left side of thigh x 5 days. Denies redness and swelling. Denies medical hx.     CC: "knot" to inner thigh.     HPI: 18 yo F with no PMHx presents to the ED with a "knot" to the left inner thigh for the last 4-5 days. No other modifying factors. Denies the use of any new soaps or lotions. Denies any injuries. Denies any redness or pain to the "knot", dysuria, frequency, or other associated symptoms. She denies any concerns for STD's.     The history is provided by the patient.   Review of patient's allergies indicates:  No Known Allergies  History reviewed. No pertinent past medical history.  History reviewed. No pertinent surgical history.  History reviewed. No pertinent family history.  Social History     Tobacco Use    Smoking status: Never    Smokeless tobacco: Never   Substance Use Topics    Alcohol use: Never    Drug use: Yes     Types: Marijuana     Review of Systems   Constitutional:  Negative for chills and fever.   HENT:  Negative for congestion, rhinorrhea and sore throat.    Eyes:  Negative for visual disturbance.   Respiratory:  Negative for cough and shortness of breath.    Cardiovascular:  Negative for chest pain.   Gastrointestinal:  Negative for abdominal pain, diarrhea, nausea and vomiting.   Genitourinary:  Negative for dysuria, frequency and hematuria.   Musculoskeletal:  Negative for back pain.   Skin:  Negative for rash.        +"knot" to left inner thigh, (-) pain, redness, swelling.    Neurological:  Negative for dizziness, weakness and headaches.     Physical Exam     Initial Vitals [10/06/22 0901]   BP Pulse Resp Temp SpO2   125/82 80 16 98.3 °F (36.8 °C) 100 %      MAP       --         Physical Exam    Nursing note and vitals reviewed.  Constitutional: She appears well-developed " and well-nourished. She is not diaphoretic. No distress.   HENT:   Head: Atraumatic.   Right Ear: External ear normal.   Left Ear: External ear normal.   Eyes: Conjunctivae and EOM are normal.   Neck: No tracheal deviation present.   Normal range of motion.  Cardiovascular:  Normal rate and regular rhythm.           Pulmonary/Chest: No accessory muscle usage or stridor. No tachypnea. No respiratory distress.   Musculoskeletal:         General: No edema. Normal range of motion.      Cervical back: Normal range of motion.     Neurological: She is alert and oriented to person, place, and time. She displays no tremor. She displays no seizure activity. Coordination and gait normal.   Skin: Skin is warm, dry and intact. Capillary refill takes less than 2 seconds. No rash noted. No erythema.   Left inguinal lymphadenopathy, without tenderness, overlying erythema, or drainage.          ED Course   Procedures  Labs Reviewed   URINALYSIS, REFLEX TO URINE CULTURE - Abnormal; Notable for the following components:       Result Value    Specific Gravity, UA >1.030 (*)     Protein, UA Trace (*)     Urobilinogen, UA 2.0-3.0 (*)     Leukocytes, UA 1+ (*)     All other components within normal limits    Narrative:     Specimen Source->Urine   C. TRACHOMATIS/N. GONORRHOEAE BY AMP DNA   URINALYSIS MICROSCOPIC    Narrative:     Specimen Source->Urine   POCT URINE PREGNANCY          Imaging Results    None          Medications   ibuprofen tablet 600 mg (600 mg Oral Given 10/6/22 0955)     Medical Decision Making:   History:   Old Medical Records: I decided to obtain old medical records.  Clinical Tests:   Lab Tests: Ordered and Reviewed  The following lab test(s) were unremarkable: UPT  ED Management:  17-year-old female presenting for pain and swelling to the left inguinal region.  Exam above.  Denies any other associated symptoms.  No drainable abscess or overlying cellulitis.  Will treat with doxycycline for inguinal lymphadenopathy  and possible STI.  GC pending.  Will have patient follow-up with primary care return ER for worsening symptoms or abdomen soft nontender.        Scribe Attestation:   Scribe #1: I performed the above scribed service and the documentation accurately describes the services I performed. I attest to the accuracy of the note.                   Clinical Impression:   Final diagnoses:  [R59.0] Inguinal lymphadenopathy (Primary)      ED Disposition Condition    Discharge Stable        I, Crista Ivey PA-C , personally performed the services described in this documentation. All medical record entries made by the scribe were at my direction and in my presence. I have reviewed the chart and agree that the record reflects my personal performance and is accurate and complete.    ED Prescriptions       Medication Sig Dispense Start Date End Date Auth. Provider    ibuprofen (ADVIL,MOTRIN) 600 MG tablet Take 1 tablet (600 mg total) by mouth every 6 (six) hours as needed for Pain. 20 tablet 10/6/2022 10/11/2022 Crista Ivey PA-C    doxycycline (VIBRAMYCIN) 100 MG Cap Take 1 capsule (100 mg total) by mouth every 12 (twelve) hours. for 10 days 20 capsule 10/6/2022 10/16/2022 Crista Ivey PA-C          Follow-up Information       Follow up With Specialties Details Why Contact Info    Michael Salas MD Obstetrics and Gynecology Schedule an appointment as soon as possible for a visit in 2 days for follow up [address]  [address]  [facility] [address]  [phone]      [facility] Emergency Dept Emergency Medicine Go to  If symptoms worsen, As needed [address] [facility] [id number] [phone]             Crista Ivey PA-C  10/06/22 6033     No

## 2023-12-23 NOTE — OB NEONATOLOGY/PEDIATRICIAN DELIVERY SUMMARY - NSPEDSNEONOTESA_OBGYN_ALL_OB_FT
Baby is a product of a 39 week gestation born to a  28 year old female with LMP 3/23/23 and EDC 23.  Maternal labs include blood type O+, Rub immune, RPR NR, Hep B negative, GBS positive , HIV negative. Maternal history is noncontributory. Pregnancy was uncomplicated. Labor unremarkable. Delivery was vaginal.  ROM at delivery. No complications during delivery. Resuscitation included: w/d/s/s.  Apgars were: 9/9. EOS score n/a. Admit to .

## 2023-12-23 NOTE — OB RN DELIVERY SUMMARY - NSSELHIDDEN_OBGYN_ALL_OB_FT
[NS_DeliveryAttending1_OBGYN_ALL_OB_FT:[id number]==],[NS_DeliveryAssist1_OBGYN_ALL_OB_FT:[id number]=],[NS_DeliveryRN_OBGYN_ALL_OB_FT:[id number]=] [NS_DeliveryAttending1_OBGYN_ALL_OB_FT:[id number]==],[NS_DeliveryAssist1_OBGYN_ALL_OB_FT:[id number]=],[NS_DeliveryRN_OBGYN_ALL_OB_FT:[id number]=] [NS_DeliveryAttending1_OBGYN_ALL_OB_FT:[id number]==],[NS_DeliveryAssist1_OBGYN_ALL_OB_FT:[id number]=],[NS_DeliveryRN_OBGYN_ALL_OB_FT:[id number]=],[NS_DeliveryAssist2_OBGYN_ALL_OB_FT:[id number]=] [NS_DeliveryAttending1_OBGYN_ALL_OB_FT:[id number]==],[NS_DeliveryAssist1_OBGYN_ALL_OB_FT:[id number]=],[NS_DeliveryRN_OBGYN_ALL_OB_FT:[id number]=],[NS_DeliveryAssist2_OBGYN_ALL_OB_FT:[id number]=]

## 2023-12-23 NOTE — OB PROVIDER DELIVERY SUMMARY - NSPROVIDERDELIVERYNOTE_OBGYN_ALL_OB_FT
Pt taken to the OR for elective primary C/S at 39 weeks  Not in labor.  Spinal anesthesia.  Low transverse  section was performed.  Delivered live male infant, vtx, through moderate amount of light meconium amniotic fluid.  No nuchal cord.  Uterus, tubes, ovaries WNL.   Hysterotomy was reapproximated with suture, excellent hemostasis obtained.  Peritoneum, rectus muscle, and fascia were reapproximated with suture, excellent hemostasis obtained.  skin closed with __________.      :   Weight:   Sex Assigned at Birth: male  APGARS:  9/9      QBL:  Uop:  IVF:   Specimens: none  Intraoperative Complications: none  Dictation #: Pt taken to the OR for elective primary C/S at 39 weeks  Not in labor.  Spinal anesthesia.  Low transverse  section was performed.  vaccuum-assisted: one application and pull, no popoffs; max 55mmHg pressure  Delivered live male infant, vtx, through moderate amount of light meconium amniotic fluid.  No nuchal cord.  Uterus, tubes, ovaries WNL.   Hysterotomy was reapproximated with suture, excellent hemostasis obtained.  Peritoneum, rectus muscle, and fascia were reapproximated with suture, excellent hemostasis obtained.  skin closed with __________.      :   Weight:   Sex Assigned at Birth: male  APGARS:  9/9      QBL:  Uop:  IVF:   Specimens: none  Intraoperative Complications: none  Dictation #: Pt taken to the OR for elective primary C/S at 39 weeks  Not in labor.  Spinal anesthesia.  Low transverse  section was performed.  vaccuum-assisted: one application and pull, no popoffs; max 55mmHg pressure  Delivered live male infant, vtx, through moderate amount of light meconium amniotic fluid.  No nuchal cord.  Apgar    Weight 3750 gm  8 lbs. 4 oz.   @ 09:23  Uterus, tubes, ovaries WNL.   Hysterotomy was reapproximated with suture, excellent hemostasis obtained.  Peritoneum, rectus muscle, and fascia were reapproximated with suture, excellent hemostasis obtained.  skin closed with 3-0 Monocryl      [place]: 09:23  Weight: 3750 gm   8 lbs. 4 oz.  Sex Assigned at Birth: male  APGARS:        QBL:424  Uop:  IVF:   Specimens: none  Intraoperative Complications: none  Dictation #:[id number] Pt taken to the OR for elective primary C/S at 39 weeks  Not in labor.  Spinal anesthesia.  Low transverse  section was performed.  vaccuum-assisted: one application and pull, no popoffs; max 55mmHg pressure  Delivered live male infant, vtx, through moderate amount of light meconium amniotic fluid.  No nuchal cord.  Apgar    Weight 3750 gm  8 lbs. 4 oz.   @ 09:23  Uterus, tubes, ovaries WNL.   Hysterotomy was reapproximated with suture, excellent hemostasis obtained.  Peritoneum, rectus muscle, and fascia were reapproximated with suture, excellent hemostasis obtained.  skin closed with 3-0 Monocryl      [place]: 09:23  Weight: 3750 gm   8 lbs. 4 oz.  Sex Assigned at Birth: male  APGARS:        QBL:424  Uop:  IVF:   Specimens: none  Intraoperative Complications: none  Dictation #:[id number]

## 2023-12-24 ENCOUNTER — TRANSCRIPTION ENCOUNTER (OUTPATIENT)
Age: 28
End: 2023-12-24

## 2023-12-24 LAB
BASOPHILS # BLD AUTO: 0.07 K/UL — SIGNIFICANT CHANGE UP (ref 0–0.2)
BASOPHILS # BLD AUTO: 0.07 K/UL — SIGNIFICANT CHANGE UP (ref 0–0.2)
BASOPHILS NFR BLD AUTO: 0.5 % — SIGNIFICANT CHANGE UP (ref 0–2)
BASOPHILS NFR BLD AUTO: 0.5 % — SIGNIFICANT CHANGE UP (ref 0–2)
EOSINOPHIL # BLD AUTO: 0.16 K/UL — SIGNIFICANT CHANGE UP (ref 0–0.5)
EOSINOPHIL # BLD AUTO: 0.16 K/UL — SIGNIFICANT CHANGE UP (ref 0–0.5)
EOSINOPHIL NFR BLD AUTO: 1.1 % — SIGNIFICANT CHANGE UP (ref 0–6)
EOSINOPHIL NFR BLD AUTO: 1.1 % — SIGNIFICANT CHANGE UP (ref 0–6)
HCT VFR BLD CALC: 29.7 % — LOW (ref 34.5–45)
HCT VFR BLD CALC: 29.7 % — LOW (ref 34.5–45)
HGB BLD-MCNC: 9.7 G/DL — LOW (ref 11.5–15.5)
HGB BLD-MCNC: 9.7 G/DL — LOW (ref 11.5–15.5)
IMM GRANULOCYTES NFR BLD AUTO: 0.8 % — SIGNIFICANT CHANGE UP (ref 0–0.9)
IMM GRANULOCYTES NFR BLD AUTO: 0.8 % — SIGNIFICANT CHANGE UP (ref 0–0.9)
LYMPHOCYTES # BLD AUTO: 19.5 % — SIGNIFICANT CHANGE UP (ref 13–44)
LYMPHOCYTES # BLD AUTO: 19.5 % — SIGNIFICANT CHANGE UP (ref 13–44)
LYMPHOCYTES # BLD AUTO: 2.77 K/UL — SIGNIFICANT CHANGE UP (ref 1–3.3)
LYMPHOCYTES # BLD AUTO: 2.77 K/UL — SIGNIFICANT CHANGE UP (ref 1–3.3)
MCHC RBC-ENTMCNC: 26.7 PG — LOW (ref 27–34)
MCHC RBC-ENTMCNC: 26.7 PG — LOW (ref 27–34)
MCHC RBC-ENTMCNC: 32.7 GM/DL — SIGNIFICANT CHANGE UP (ref 32–36)
MCHC RBC-ENTMCNC: 32.7 GM/DL — SIGNIFICANT CHANGE UP (ref 32–36)
MCV RBC AUTO: 81.8 FL — SIGNIFICANT CHANGE UP (ref 80–100)
MCV RBC AUTO: 81.8 FL — SIGNIFICANT CHANGE UP (ref 80–100)
MONOCYTES # BLD AUTO: 1.35 K/UL — HIGH (ref 0–0.9)
MONOCYTES # BLD AUTO: 1.35 K/UL — HIGH (ref 0–0.9)
MONOCYTES NFR BLD AUTO: 9.5 % — SIGNIFICANT CHANGE UP (ref 2–14)
MONOCYTES NFR BLD AUTO: 9.5 % — SIGNIFICANT CHANGE UP (ref 2–14)
NEUTROPHILS # BLD AUTO: 9.74 K/UL — HIGH (ref 1.8–7.4)
NEUTROPHILS # BLD AUTO: 9.74 K/UL — HIGH (ref 1.8–7.4)
NEUTROPHILS NFR BLD AUTO: 68.6 % — SIGNIFICANT CHANGE UP (ref 43–77)
NEUTROPHILS NFR BLD AUTO: 68.6 % — SIGNIFICANT CHANGE UP (ref 43–77)
PLATELET # BLD AUTO: 253 K/UL — SIGNIFICANT CHANGE UP (ref 150–400)
PLATELET # BLD AUTO: 253 K/UL — SIGNIFICANT CHANGE UP (ref 150–400)
RBC # BLD: 3.63 M/UL — LOW (ref 3.8–5.2)
RBC # BLD: 3.63 M/UL — LOW (ref 3.8–5.2)
RBC # FLD: 14.4 % — SIGNIFICANT CHANGE UP (ref 10.3–14.5)
RBC # FLD: 14.4 % — SIGNIFICANT CHANGE UP (ref 10.3–14.5)
WBC # BLD: 14.2 K/UL — HIGH (ref 3.8–10.5)
WBC # BLD: 14.2 K/UL — HIGH (ref 3.8–10.5)
WBC # FLD AUTO: 14.2 K/UL — HIGH (ref 3.8–10.5)
WBC # FLD AUTO: 14.2 K/UL — HIGH (ref 3.8–10.5)

## 2023-12-24 RX ORDER — IBUPROFEN 200 MG
600 TABLET ORAL EVERY 6 HOURS
Refills: 0 | Status: DISCONTINUED | OUTPATIENT
Start: 2023-12-24 | End: 2023-12-25

## 2023-12-24 RX ORDER — IBUPROFEN 200 MG
1 TABLET ORAL
Qty: 30 | Refills: 0
Start: 2023-12-24

## 2023-12-24 RX ORDER — ACETAMINOPHEN 500 MG
3 TABLET ORAL
Qty: 30 | Refills: 0
Start: 2023-12-24

## 2023-12-24 RX ORDER — FERROUS SULFATE 325(65) MG
325 TABLET ORAL DAILY
Refills: 0 | Status: DISCONTINUED | OUTPATIENT
Start: 2023-12-24 | End: 2023-12-25

## 2023-12-24 RX ORDER — FOLIC ACID 0.8 MG
1 TABLET ORAL DAILY
Refills: 0 | Status: DISCONTINUED | OUTPATIENT
Start: 2023-12-24 | End: 2023-12-25

## 2023-12-24 RX ORDER — ASCORBIC ACID 60 MG
500 TABLET,CHEWABLE ORAL DAILY
Refills: 0 | Status: DISCONTINUED | OUTPATIENT
Start: 2023-12-24 | End: 2023-12-25

## 2023-12-24 RX ADMIN — Medication 975 MILLIGRAM(S): at 03:11

## 2023-12-24 RX ADMIN — Medication 1 MILLIGRAM(S): at 11:12

## 2023-12-24 RX ADMIN — Medication 500 MILLIGRAM(S): at 11:13

## 2023-12-24 RX ADMIN — Medication 975 MILLIGRAM(S): at 15:22

## 2023-12-24 RX ADMIN — ENOXAPARIN SODIUM 40 MILLIGRAM(S): 100 INJECTION SUBCUTANEOUS at 20:50

## 2023-12-24 RX ADMIN — Medication 600 MILLIGRAM(S): at 17:50

## 2023-12-24 RX ADMIN — Medication 325 MILLIGRAM(S): at 11:12

## 2023-12-24 RX ADMIN — Medication 600 MILLIGRAM(S): at 11:12

## 2023-12-24 RX ADMIN — Medication 1 TABLET(S): at 11:13

## 2023-12-24 RX ADMIN — Medication 975 MILLIGRAM(S): at 20:50

## 2023-12-24 RX ADMIN — Medication 975 MILLIGRAM(S): at 08:29

## 2023-12-24 RX ADMIN — Medication 600 MILLIGRAM(S): at 23:25

## 2023-12-24 RX ADMIN — Medication 30 MILLIGRAM(S): at 06:19

## 2023-12-24 NOTE — DISCHARGE NOTE OB - NS MD DC FALL RISK RISK
For information on Fall & Injury Prevention, visit: [email or web address] OR  [email or web address] OR  https://www.cdc.gov/steadi/patient.html For information on Fall & Injury Prevention, visit: [email or web address]://www.[facility]/[email or web address]-[email or web address]-[email or web address]-mobility OR  https://www.[facility]/[email or web address]-tips-[email or web address]-avoid-injury OR  https://www.cdc.gov/steadi/patient.html

## 2023-12-24 NOTE — DISCHARGE NOTE OB - HOSPITAL COURSE
Patient underwent an uncomplicated primary elective . Post-partum course was uncomplicated. Pain is well controlled with PRN medication. She has no difficulty with ambulation, voiding, or PO intake. Lab values and vital signs are within normal limits prior to discharge.   Patient underwent an uncomplicated primary elective . Post-partum course was uncomplicated. Pain is well controlled with PRN medication. She has no difficulty with ambulation, voiding, or PO intake. Lab values and vital signs are within normal limits prior to discharge.  anemia secondary to blood loss at delivery

## 2023-12-24 NOTE — DISCHARGE NOTE OB - CARE PROVIDER_API CALL
Fallon Sanchez  Obstetrics and Gynecology  [address] [place] [id number]  Phone: [phone]  Fax: [phone]  Follow Up Time: 2 weeks   Fallon Sanchez  Obstetrics and Gynecology  [address] [place] [id number]  Phone: [phone]  Fax: [phone]  Follow Up Time: 2 weeks

## 2023-12-24 NOTE — DISCHARGE NOTE OB - PATIENT PORTAL LINK FT
You can access the FollowMyHealth Patient Portal offered by [facility] by registering at the following website: [email or web address]. By joining [employer]’s FollowMyHealth portal, you will also be able to view your health information using other applications (apps) compatible with our system. You can access the FollowMyHealth Patient Portal offered by [facility] by registering at the following website: [email or web address]. By joining [employer]’s FollowMyHealth portal, you will also be able to view your health information using other applications (apps) compatible with our system.

## 2023-12-25 VITALS
TEMPERATURE: 98 F | SYSTOLIC BLOOD PRESSURE: 115 MMHG | RESPIRATION RATE: 17 BRPM | OXYGEN SATURATION: 97 % | HEART RATE: 82 BPM | DIASTOLIC BLOOD PRESSURE: 79 MMHG

## 2023-12-25 LAB
HCT VFR BLD CALC: 30.2 % — LOW (ref 34.5–45)
HCT VFR BLD CALC: 30.2 % — LOW (ref 34.5–45)
HGB BLD-MCNC: 10.3 G/DL — LOW (ref 11.5–15.5)
HGB BLD-MCNC: 10.3 G/DL — LOW (ref 11.5–15.5)
MCHC RBC-ENTMCNC: 27.2 PG — SIGNIFICANT CHANGE UP (ref 27–34)
MCHC RBC-ENTMCNC: 27.2 PG — SIGNIFICANT CHANGE UP (ref 27–34)
MCHC RBC-ENTMCNC: 34.1 GM/DL — SIGNIFICANT CHANGE UP (ref 32–36)
MCHC RBC-ENTMCNC: 34.1 GM/DL — SIGNIFICANT CHANGE UP (ref 32–36)
MCV RBC AUTO: 79.9 FL — LOW (ref 80–100)
MCV RBC AUTO: 79.9 FL — LOW (ref 80–100)
PLATELET # BLD AUTO: 267 K/UL — SIGNIFICANT CHANGE UP (ref 150–400)
PLATELET # BLD AUTO: 267 K/UL — SIGNIFICANT CHANGE UP (ref 150–400)
RBC # BLD: 3.78 M/UL — LOW (ref 3.8–5.2)
RBC # BLD: 3.78 M/UL — LOW (ref 3.8–5.2)
RBC # FLD: 14.8 % — HIGH (ref 10.3–14.5)
RBC # FLD: 14.8 % — HIGH (ref 10.3–14.5)
WBC # BLD: 11.16 K/UL — HIGH (ref 3.8–10.5)
WBC # BLD: 11.16 K/UL — HIGH (ref 3.8–10.5)
WBC # FLD AUTO: 11.16 K/UL — HIGH (ref 3.8–10.5)
WBC # FLD AUTO: 11.16 K/UL — HIGH (ref 3.8–10.5)

## 2023-12-25 PROCEDURE — 85025 COMPLETE CBC W/AUTO DIFF WBC: CPT

## 2023-12-25 PROCEDURE — 59050 FETAL MONITOR W/REPORT: CPT

## 2023-12-25 PROCEDURE — 90686 IIV4 VACC NO PRSV 0.5 ML IM: CPT

## 2023-12-25 PROCEDURE — 86901 BLOOD TYPING SEROLOGIC RH(D): CPT

## 2023-12-25 PROCEDURE — 86780 TREPONEMA PALLIDUM: CPT

## 2023-12-25 PROCEDURE — 85027 COMPLETE CBC AUTOMATED: CPT

## 2023-12-25 PROCEDURE — 86900 BLOOD TYPING SEROLOGIC ABO: CPT

## 2023-12-25 PROCEDURE — 90715 TDAP VACCINE 7 YRS/> IM: CPT

## 2023-12-25 PROCEDURE — 86850 RBC ANTIBODY SCREEN: CPT

## 2023-12-25 PROCEDURE — 36415 COLL VENOUS BLD VENIPUNCTURE: CPT

## 2023-12-25 RX ADMIN — Medication 600 MILLIGRAM(S): at 06:22

## 2023-12-25 RX ADMIN — Medication 975 MILLIGRAM(S): at 03:46

## 2023-12-25 NOTE — PROGRESS NOTE ADULT - SUBJECTIVE AND OBJECTIVE BOX
POD # 2    Patient  resting comfortably in NAD  Afebrile VSS  Abdom.  Soft  Not tender  Fundus  firm  Extrem.  No Homans  Lochia  Nl  Flatus +  Voiding +  BM +      WBC 11.16  H/H 10.3/40.2  Platel 267    Patient s/p c/s  Patient stable and doing well  DC to home   F/U office 2 days     Anemia secondary to acute blood loss with delivery
INTERVAL HPI/OVERNIGHT EVENTS:  28y Female s/p c section under spinal anesthesia with duramorph for post op analgesia on 12/23/23    Vital Signs Last 24 Hrs  T(C): 36.7 (24 Dec 2023 07:36), Max: 36.8 (23 Dec 2023 15:52)  T(F): 98.1 (24 Dec 2023 07:36), Max: 98.3 (23 Dec 2023 15:52)  HR: 70 (24 Dec 2023 07:36) (60 - 70)  BP: 97/62 (24 Dec 2023 07:36) (97/62 - 105/65)  BP(mean): --  RR: 18 (24 Dec 2023 07:36) (18 - 18)  SpO2: 98% (24 Dec 2023 07:36) (97% - 99%)    Parameters below as of 23 Dec 2023 15:52  Patient On (Oxygen Delivery Method): room air            Patient's overall anesthesia satisfaction: Positive    Patients pain is well controlled with IT duramorph    No respiratory events overnight    No pruritis at this time    Patient doing well     No headache      No residual numbness or weakness, sensory and motor function intact.    No anesthetic complications or complaints noted or reported          .            
POD # 1    Patient resting comfortably in NAD  Afebrile VSS  Abdomen  soft  not tender  Fundus  firm  Extrem  No Homans  Lochia nl  Voiding +  Flatus +    WBC 14.2  H/H 9.7/29.7  Platel. 253  VDRL Neg  O +    Patient s/p c/s  continue post op care  check cbc      
GIOVANNY CORTÉS is a 28y  now POD#1 s/p vaccuum-assisted  section at 39 weeks gestation, uncomplicated. elective primary c/s    S:    No acute events overnight.   The patient has no complaints.  Pain controlled with current treatment regimen.   She is ambulating without difficulty and tolerating PO.   + flatus/-BM/+ voiding   She endorses appropriate lochia, which is decreasing.   She is breastfeeding without difficulty. She denies feeling engorged.   She denies fevers, chills, nausea and vomiting.   She denies lightheadedness, dizziness, palpitations, chest pain and SOB.     O:    T(C): 36.3 (23 @ 04:16), Max: 36.8 (23 @ 05:45)  HR: 60 (23 @ 04:16) (60 - 106)  BP: 104/63 (23 @ 04:16) (102/53 - 142/69)  RR: 18 (23 @ 04:16) (16 - 19)  SpO2: 99% (23 @ 04:16) (90% - 99%)    Gen: NAD, AOx3  Abdomen:  Soft, non-tender, non-distended, +bowel sounds  Incision: Clean/dry/intact with mepilex in place. Pressure dressing removed  Uterus:  Fundus firm below umbilicus  VE:  Expected lochia  Ext:  Bilateral lower extremities non-tender and non-edematous                          12.0   12.78 )-----------( 314      ( 23 Dec 2023 06:30 )             35.6         
GIOVANNY CORTÉS is a 28y  now POD#2 s/p vaccuum-assisted  section at 39 weeks gestation, uncomplicated. Elective primary c/s    S:    No acute events overnight.   The patient has no complaints.  Pain controlled with current treatment regimen.   She is ambulating without difficulty and tolerating PO.   + flatus/-BM/+ voiding   She endorses appropriate lochia, which is decreasing.   She is breastfeeding without difficulty. She denies feeling engorged.   She denies fevers, chills, nausea and vomiting.   She denies lightheadedness, dizziness, palpitations, chest pain and SOB.     O:    ICU Vital Signs Last 24 Hrs  T(C): 36.9 (25 Dec 2023 03:48), Max: 36.9 (25 Dec 2023 03:48)  T(F): 98.5 (25 Dec 2023 03:48), Max: 98.5 (25 Dec 2023 03:48)  HR: 82 (25 Dec 2023 03:48) (63 - 82)  BP: 115/79 (25 Dec 2023 03:48) (97/62 - 115/79)  RR: 17 (25 Dec 2023 03:48) (17 - 18)  SpO2: 97% (25 Dec 2023 03:48) (97% - 100%)    Gen: NAD, AOx3  Abdomen:  Soft, non-tender, non-distended, +bowel sounds  Incision: Clean/dry/intact with mepilex in place.   Uterus:  Fundus firm below umbilicus  VE:  Expected lochia  Ext:  Bilateral lower extremities non-tender and non-edematous                          10.3   11.16 )-----------( 267      ( 25 Dec 2023 04:40 )             30.2                           12.0   12.78 )-----------( 314      ( 23 Dec 2023 06:30 )             35.6

## 2023-12-25 NOTE — PROGRESS NOTE ADULT - ASSESSMENT
A/P:  28y  now POD#2 s/p vaccuum-assisted  section at 39 weeks gestation. elective primary c/s  -Vital signs stable  -Hgb: 12-> 9.7  -WBC 12.78 > 11.16  -Voiding, tolerating PO, bowel function nml   -Advance care as tolerated   -Continue routine postpartum and postoperative care and education  -Healthy male infant, desires circumcision  - DVT ppx: Lovenox ordered/ Ambulation encouraged. SCDs while in bed.   -Dispo: Patient to be discharged home today pending attending approval.   
    A/P:  28y  now POD#1 s/p vaccuum-assisted  section at 39 weeks gestation, uncomplicated. elective primary c/s  -Vital signs stable  -Hgb: 12-> AM labs pending   -Voiding, tolerating PO, bowel function nml   -Advance care as tolerated   -Continue routine postpartum and postoperative care and education  -Healthy male infant, desires circumcision  - DVT ppx: Lovenox ordered/ Ambulation encouraged. SCDs while in bed.   -Dispo: Patient to be discharged when meeting all postpartum and postoperative milestones and pending attending approval. Patient strongly desires late discharge tonight or early discharge tomorrow morning. Continue inpatient care

## 2023-12-27 ENCOUNTER — APPOINTMENT (OUTPATIENT)
Dept: OBGYN | Facility: CLINIC | Age: 28
End: 2023-12-27
Payer: COMMERCIAL

## 2023-12-27 VITALS
SYSTOLIC BLOOD PRESSURE: 119 MMHG | WEIGHT: 181 LBS | DIASTOLIC BLOOD PRESSURE: 82 MMHG | BODY MASS INDEX: 34.2 KG/M2 | HEART RATE: 105 BPM

## 2023-12-27 PROCEDURE — 99024 POSTOP FOLLOW-UP VISIT: CPT

## 2023-12-27 NOTE — HISTORY OF PRESENT ILLNESS
[Delivery Date: ___] : on [unfilled] [Male] : Delivery History: baby boy [Wt. ___] : weighing [unfilled] [Breastfeeding] : currently nursing [Primary C/S] : delivered by  section [BF with Difficulty] : nursing without difficulty [None] : No associated symptoms are reported [Clean/Dry/Intact] : clean, dry and intact [Erythema] : not erythematous [Swelling] : not swollen [Dehiscence] : not dehisced [Healed] : healed [Cervix Sample Taken] : cervical sample not taken for a Pap smear [Not Done] : Examination of breasts not done [Doing Well] : is doing well [No Sign of Infection] : is showing no signs of infection [Excellent Pain Control] : has excellent pain control [FreeTextEntry1] : Patient is a 28-year-old status post  section elective primary on  for viable male infant weighing 8 pounds 4 ounces Patient states she is breast-feeding Patient denies any difficulty passing flatus, urinating, or bowel movements, Dressing removed incisions healing well clean, dry, intact, Follow-up 2 weeks  Dona was present as a chaperone for the entire assessment and examination this patient and also patient's  was in the room the entire time

## 2023-12-28 ENCOUNTER — APPOINTMENT (OUTPATIENT)
Age: 28
End: 2023-12-28
Payer: COMMERCIAL

## 2023-12-28 PROCEDURE — S9443: CPT | Mod: 95

## 2023-12-29 ENCOUNTER — APPOINTMENT (OUTPATIENT)
Age: 28
End: 2023-12-29
Payer: COMMERCIAL

## 2023-12-29 PROCEDURE — S9443: CPT | Mod: 95

## 2024-01-11 ENCOUNTER — APPOINTMENT (OUTPATIENT)
Dept: OBGYN | Facility: CLINIC | Age: 29
End: 2024-01-11
Payer: COMMERCIAL

## 2024-01-11 VITALS
HEIGHT: 61 IN | BODY MASS INDEX: 32.85 KG/M2 | HEART RATE: 102 BPM | DIASTOLIC BLOOD PRESSURE: 87 MMHG | SYSTOLIC BLOOD PRESSURE: 130 MMHG | WEIGHT: 174 LBS

## 2024-01-11 DIAGNOSIS — Z3A.39 39 WEEKS GESTATION OF PREGNANCY: ICD-10-CM

## 2024-01-11 DIAGNOSIS — Z34.91 ENCOUNTER FOR SUPERVISION OF NORMAL PREGNANCY, UNSPECIFIED, FIRST TRIMESTER: ICD-10-CM

## 2024-01-11 PROCEDURE — 0503F POSTPARTUM CARE VISIT: CPT

## 2024-01-11 NOTE — HISTORY OF PRESENT ILLNESS
[Delivery Date: ___] : on [unfilled] [Primary C/S] : delivered by  section [Male] : Delivery History: baby boy [Wt. ___] : weighing [unfilled] [Breastfeeding] : currently nursing [None] : No associated symptoms are reported [Clean/Dry/Intact] : clean, dry and intact [Erythema] : not erythematous [Swelling] : not swollen [Dehiscence] : not dehisced [Healed] : healed [Mild] : mild vaginal bleeding [Cervix Sample Taken] : cervical sample not taken for a Pap smear [Not Done] : Examination of breasts not done [Doing Well] : is doing well [No Sign of Infection] : is showing no signs of infection [Excellent Pain Control] : has excellent pain control [FreeTextEntry1] : Patient is a 28-year-old status post primary  section,, elective,, for viable male infant weighing 8 pounds 4 ounces Patient denies any difficulty passing urine, passing flatus, or bowel movements, Patient states she is breast-feeding Incision healing well, clean, dry, intact,  follow-up 4 weeks  Dona was present as a chaperone for the entire assessment and examination of this patient and also patient's  was in the room the entire time

## 2024-02-05 ENCOUNTER — APPOINTMENT (OUTPATIENT)
Age: 29
End: 2024-02-05
Payer: COMMERCIAL

## 2024-02-05 PROCEDURE — S9443: CPT

## 2024-02-08 ENCOUNTER — APPOINTMENT (OUTPATIENT)
Dept: OBGYN | Facility: CLINIC | Age: 29
End: 2024-02-08
Payer: COMMERCIAL

## 2024-02-08 VITALS
DIASTOLIC BLOOD PRESSURE: 86 MMHG | SYSTOLIC BLOOD PRESSURE: 123 MMHG | BODY MASS INDEX: 31.72 KG/M2 | HEART RATE: 103 BPM | HEIGHT: 61 IN | WEIGHT: 168 LBS

## 2024-02-08 PROCEDURE — 0503F POSTPARTUM CARE VISIT: CPT

## 2024-02-08 NOTE — HISTORY OF PRESENT ILLNESS
[Postpartum Follow Up] : postpartum follow up [Delivery Date: ___] : on [unfilled] [Primary C/S] : delivered by  section [Male] : Delivery History: baby boy [Wt. ___] : weighing [unfilled] [Breastfeeding] : currently nursing [None] : No associated symptoms are reported [Clean/Dry/Intact] : clean, dry and intact [Erythema] : not erythematous [Swelling] : not swollen [Dehiscence] : not dehisced [Healed] : healed [Cervix Sample Taken] : cervical sample not taken for a Pap smear [Not Done] : Examination of breasts not done [Doing Well] : is doing well [No Sign of Infection] : is showing no signs of infection [Excellent Pain Control] : has excellent pain control [FreeTextEntry1] : Patient is a 28-year-old status post primary  section on  for LGA baby male weighing 8 pounds 4 ounces Patient states she is breast-feeding Patient denies any difficulty passing urine, passing flatus, bowel meds, Incision healed well, clean, dry, intact, Patient does not desire any contraception at this point Patient had history of a right breast lump that was biopsied during the pregnancy with benign findings but obviously the lump is still there patient will be referred to breast surgeon for further follow-up after she has completed breast-feeding Questions answered Patient does complain of some increased crying spells and mood swings postdelivery but this point declines to be medicated Patient will be referred to the [facility] postpartum depression anxiety counseling and encounter groups  Dona was present as a chaperone for the entire assessment and examination of this patient

## 2024-02-27 ENCOUNTER — APPOINTMENT (OUTPATIENT)
Dept: SURGERY | Facility: CLINIC | Age: 29
End: 2024-02-27
Payer: COMMERCIAL

## 2024-02-27 ENCOUNTER — RESULT REVIEW (OUTPATIENT)
Age: 29
End: 2024-02-27

## 2024-02-27 VITALS
DIASTOLIC BLOOD PRESSURE: 80 MMHG | SYSTOLIC BLOOD PRESSURE: 118 MMHG | HEIGHT: 61 IN | WEIGHT: 168 LBS | BODY MASS INDEX: 31.72 KG/M2

## 2024-02-27 DIAGNOSIS — Z78.9 OTHER SPECIFIED HEALTH STATUS: ICD-10-CM

## 2024-02-27 DIAGNOSIS — R92.8 OTHER ABNORMAL AND INCONCLUSIVE FINDINGS ON DIAGNOSTIC IMAGING OF BREAST: ICD-10-CM

## 2024-02-27 DIAGNOSIS — D24.9 BENIGN NEOPLASM OF UNSPECIFIED BREAST: ICD-10-CM

## 2024-02-27 PROCEDURE — 99203 OFFICE O/P NEW LOW 30 MIN: CPT

## 2024-02-27 NOTE — ASSESSMENT
[FreeTextEntry1] : Patient is a 28 year old female with palpable right breast lump during pregnancy at 30 weeks. Biopsy demonstrated Right 10:00 9 cm FN fibroadenoma - US 3.3 X 1.8 X 3.0 CM. Patient felt it has decreased in size since.   Will get US right breast now to evaluate. Will call patient with results.   We discussed the patient's diagnosis of fibroadenoma and its pathophysiology. We discussed that these are most common in young women, can fluctuate in size, and often times regress after menopause. We discussed that these are benign masses and do not increase the breast cancer risk by much, if at all. They can sometimes be sources of pain/discomfort, but can also by asymptomatic. We discussed the treatment options including continued surveillance vs surgical excision, and reasons for excision including enlarging size and pain. She elected for observing, continued surveillance with imaging.   Follow up in 6 months  Patient verbalized understanding of all treatment plans. All of her questions were answered to the best of my ability. She was encouraged to call the office for any questions or concerns.   Plan 1. US Right breast now 2. Follow up in 6 months

## 2024-02-27 NOTE — PAST MEDICAL HISTORY
[Menstruating] : The patient is menstruating [Menarche Age ____] : age at menarche was [unfilled] [Definite ___ (Date)] : the last menstrual period was [unfilled] [Normal Amount/Duration] : it was of a normal amount and duration [Regular Cycle Intervals] : have been regular [Total Preg ___] : G[unfilled] [Live Births ___] : P[unfilled]  [Age At Live Birth ___] : Age at live birth: [unfilled] [History of Hormone Replacement Treatment] : has no history of hormone replacement treatment [FreeTextEntry5] : c section, polyp removal  [FreeTextEntry6] : denies  [FreeTextEntry7] : yes  [FreeTextEntry8] : yes

## 2024-02-27 NOTE — PHYSICAL EXAM
[Normocephalic] : normocephalic [Atraumatic] : atraumatic [EOMI] : extra ocular movement intact [PERRL] : pupils equal, round and reactive to light [Sclera nonicteric] : sclera nonicteric [Conjunctiva pink] : conjunctiva pink [Supple] : supple [No Supraclavicular Adenopathy] : no supraclavicular adenopathy [No Cervical Adenopathy] : no cervical adenopathy [Examined in the supine and seated position] : examined in the supine and seated position [Symmetrical] : symmetrical [Bra Size: ___] : Bra Size: [unfilled] [Grade 1] : Ptosis Grade 1 [No dominant masses] : no dominant masses left breast [No Nipple Retraction] : no left nipple retraction [No Nipple Discharge] : no left nipple discharge [de-identified] : palpable mobile mass 1.0 cm 10:00

## 2024-02-27 NOTE — HISTORY OF PRESENT ILLNESS
[FreeTextEntry1] : Problem List:  1. Right 10:00 9 cm FN fibroadenoma - Biopsy proven -US 3.3 X 1.8 X 3.0 CM  Care Team:  MIKAL Sanchez   HPI: Patient is a 28-year-old female presenting for initial consultation on 2/27/24 for right fibroadenoma. Patient was 30 weeks pregnant in october when she felt a painful lump in her right breast. Patient underwent right breast ultrasound on 10/19/23 that demonstrated at the site of patients concern 10:00 axis 9 cm FN a lobulated hypoechoic solid mass with internal cystic foci is identified which measures 3.3 x 1.8 x 3.0 cm. Ultrasound guided right breast biopsy preformed on 10/27/23 demonstrated fibroadenoma, benign, CONCORDANT. Patient states since the lump has decreased in size and is not painful anymore. Patient denies nipple discharge. Patient currently breastfeeding.  Breast History: Breast reduction in 2019 by Dr. Gisella raymond [employer]. Preforms SBE. Family history of breast cancer paternal grandmother.   Imaging:  10/19/23: Ulices delgado [place]: Right Breast Ultrasound Limited: Density C, Impression - At the site of patient concern at the 10:00 axis 9 cm FN a lobulated hypoechoic solid mass with internal cystic foci is identified which measures at least 3.3 x 1.8 x 3.0 cm. Ultrasound guided biopsy is advised. BI-RADS 4A.  10/27/23: Ulices Lam: No marking clip placed, Ultrasound Guided Right Breast Biopsy: Impression - fibroadenoma, benign, CONCORDANT.

## 2024-03-01 ENCOUNTER — APPOINTMENT (OUTPATIENT)
Dept: ULTRASOUND IMAGING | Facility: CLINIC | Age: 29
End: 2024-03-01
Payer: COMMERCIAL

## 2024-03-01 ENCOUNTER — OUTPATIENT (OUTPATIENT)
Dept: OUTPATIENT SERVICES | Facility: HOSPITAL | Age: 29
LOS: 1 days | End: 2024-03-01
Payer: COMMERCIAL

## 2024-03-01 ENCOUNTER — RESULT REVIEW (OUTPATIENT)
Age: 29
End: 2024-03-01

## 2024-03-01 DIAGNOSIS — D24.9 BENIGN NEOPLASM OF UNSPECIFIED BREAST: ICD-10-CM

## 2024-03-01 PROCEDURE — 76642 ULTRASOUND BREAST LIMITED: CPT

## 2024-03-01 PROCEDURE — 76642 ULTRASOUND BREAST LIMITED: CPT | Mod: 26,RT

## 2024-03-04 ENCOUNTER — NON-APPOINTMENT (OUTPATIENT)
Age: 29
End: 2024-03-04

## 2024-03-04 DIAGNOSIS — Z98.891 HISTORY OF UTERINE SCAR FROM PREVIOUS SURGERY: ICD-10-CM

## 2024-03-04 DIAGNOSIS — Z14.8 GENETIC CARRIER OF OTHER DISEASE: ICD-10-CM

## 2024-03-04 DIAGNOSIS — Z87.42 PERSONAL HISTORY OF OTHER DISEASES OF THE FEMALE GENITAL TRACT: ICD-10-CM

## 2024-03-04 DIAGNOSIS — Z98.890 OTHER SPECIFIED POSTPROCEDURAL STATES: ICD-10-CM

## 2024-03-04 RX ORDER — FLUOXETINE HYDROCHLORIDE 10 MG/1
10 TABLET ORAL DAILY
Qty: 90 | Refills: 3 | Status: ACTIVE | COMMUNITY
Start: 2024-03-04 | End: 1900-01-01

## 2024-03-13 ENCOUNTER — APPOINTMENT (OUTPATIENT)
Dept: ORTHOPEDIC SURGERY | Facility: CLINIC | Age: 29
End: 2024-03-13
Payer: COMMERCIAL

## 2024-03-13 VITALS
DIASTOLIC BLOOD PRESSURE: 79 MMHG | BODY MASS INDEX: 31.72 KG/M2 | SYSTOLIC BLOOD PRESSURE: 110 MMHG | HEIGHT: 61 IN | WEIGHT: 168 LBS | HEART RATE: 102 BPM

## 2024-03-13 DIAGNOSIS — M25.532 PAIN IN RIGHT WRIST: ICD-10-CM

## 2024-03-13 DIAGNOSIS — M25.531 PAIN IN RIGHT WRIST: ICD-10-CM

## 2024-03-13 PROCEDURE — 99204 OFFICE O/P NEW MOD 45 MIN: CPT | Mod: 25

## 2024-03-13 PROCEDURE — 73130 X-RAY EXAM OF HAND: CPT | Mod: 50

## 2024-03-13 PROCEDURE — 20550 NJX 1 TENDON SHEATH/LIGAMENT: CPT | Mod: 59,LT

## 2024-03-13 PROCEDURE — 20526 THER INJECTION CARP TUNNEL: CPT | Mod: 50

## 2024-03-13 NOTE — HISTORY OF PRESENT ILLNESS
[FreeTextEntry1] : Ruthann is a pleasant 28-year-old female who presents today with chronic exacerbated bilateral hand and wrist discomfort as well as numbness and tingling.  She also describes left wrist and thumb pain and difficulty with activities that involve pinch and  especially holding her .  Of note, she is 3 months postpartum.  The symptoms are bothersome and are affecting her ADLs.

## 2024-03-13 NOTE — ASSESSMENT
[FreeTextEntry1] : ASSESSMENT: The patient comes in today with chronic exacerbated bilateral hand and wrist discomfort as well as numbness and tingling.  She also describes left wrist and thumb pain and difficulty with activities that involve pinch and  especially holding her .  Of note, she is 3 months postpartum.  The symptoms are bothersome and are affecting her ADLs.  Her symptoms are consistent with bilateral carpal tunnel syndrome as well as left de Quervain's tenosynovitis.  Treatment modalities were discussed, the patient elects for injections.   The patient was adequately and thoroughly informed of my assessment of their current condition(s).  - This may diminish bodily function for the extremity.  We discussed prognosis, treatment modalities including operative and nonoperative options for the above diagnostic assessment. As always, 2nd opinion is always provided as an option. For this, when accessible, I was able to review other physicians note(s) including reviewing other tests, imaging results as well as personally view these results for my own interpretation.      Injection:   The risks and benefits of a steroid injection were discussed in detail. The risks include but are not limited to: pain, infection, swelling, flare response, bleeding, subcutaneous fat atrophy, skin depigmentation and/or elevation of blood sugar. The risk of incomplete resolution of symptoms, recurrence and additional intervention was reviewed and considered by the patient.  The patient agreed to proceed and under a sterile prep, I injected 1 unit (6mg) into 1 cc of a combination of Celestone and Lidocaine into the [bilateral carpal tunnel, left de Quervain's]. The patient tolerated the injection well.   The patient was adequately and thoroughly informed of my assessment of their current condition(s).    DISCUSSION: 1.  Injections as above.  Activity modifications.  Bracing as needed.  Follow-up in 6 weeks. 2. [x] 3. [x]

## 2024-03-13 NOTE — PHYSICAL EXAM
[de-identified] : Examination of the [bilateral] wrist reveals discomfort with compression at the level of the volar carpal tunnel eliciting numbness/tingling throughout the fingertips. Examination at the level of the [left] wrist reveals tenderness at the level of the first dorsal compartment with a positive finkelstein.    [de-identified] : [4] views of [bilateral hands and wrists] were obtained today in my office and were seen by me and discussed with the patient.  These negative.

## 2024-04-24 ENCOUNTER — APPOINTMENT (OUTPATIENT)
Dept: ORTHOPEDIC SURGERY | Facility: CLINIC | Age: 29
End: 2024-04-24

## 2024-04-29 ENCOUNTER — APPOINTMENT (OUTPATIENT)
Dept: OBGYN | Facility: CLINIC | Age: 29
End: 2024-04-29

## 2024-05-02 ENCOUNTER — APPOINTMENT (OUTPATIENT)
Dept: ORTHOPEDIC SURGERY | Facility: CLINIC | Age: 29
End: 2024-05-02
Payer: COMMERCIAL

## 2024-05-02 VITALS
BODY MASS INDEX: 31.72 KG/M2 | DIASTOLIC BLOOD PRESSURE: 80 MMHG | WEIGHT: 168 LBS | HEART RATE: 81 BPM | SYSTOLIC BLOOD PRESSURE: 120 MMHG | HEIGHT: 61 IN

## 2024-05-02 DIAGNOSIS — M65.4 RADIAL STYLOID TENOSYNOVITIS [DE QUERVAIN]: ICD-10-CM

## 2024-05-02 DIAGNOSIS — M25.532 PAIN IN LEFT WRIST: ICD-10-CM

## 2024-05-02 DIAGNOSIS — G56.00 CARPAL TUNNEL SYNDROME, UNSPECIFIED UPPER LIMB: ICD-10-CM

## 2024-05-02 PROCEDURE — 99214 OFFICE O/P EST MOD 30 MIN: CPT | Mod: 25

## 2024-05-02 PROCEDURE — 20550 NJX 1 TENDON SHEATH/LIGAMENT: CPT | Mod: LT

## 2024-05-02 NOTE — ASSESSMENT
[FreeTextEntry1] : ASSESSMENT: The patient comes in today with for follow up with chronic exacerbated bilateral hand and wrist discomfort as well as numbness and tingling. She also describes left wrist and thumb pain and difficulty with activities that involve pinch and  especially holding her . Of note, she recently gave birth around 2023. The symptoms are bothersome and are affecting her ADLs.  She describes tremendous relief after injections visit, however her left wrist symptoms have returned. Her symptoms are left de Quervain's tenosynovitis.  Treatment modalities were discussed, the patient elects for a repeat injection.  She will continue with activity modifications for her bilateral carpal tunnel symptoms.   The patient was adequately and thoroughly informed of my assessment of their current condition(s).  - This may diminish bodily function for the extremity.  We discussed prognosis, treatment modalities including operative and nonoperative options for the above diagnostic assessment. As always, 2nd opinion is always provided as an option. For this, when accessible, I was able to review other physicians note(s) including reviewing other tests, imaging results as well as personally view these results for my own interpretation.      Injection:   The risks and benefits of a steroid injection were discussed in detail. The risks include but are not limited to: pain, infection, swelling, flare response, bleeding, subcutaneous fat atrophy, skin depigmentation and/or elevation of blood sugar. The risk of incomplete resolution of symptoms, recurrence and additional intervention was reviewed and considered by the patient.  The patient agreed to proceed and under a sterile prep, I injected 1 unit (6mg) into 1 cc of a combination of Celestone and Lidocaine into the [left de Quervain's]. The patient tolerated the injection well.   The patient was adequately and thoroughly informed of my assessment of their current condition(s).    DISCUSSION: 1.  Injection as above.  Activity modifications.  Bracing as needed.  Follow-up in 2 months. 2.  Activity modifications for bilateral carpal tunnel symptoms. 3. [x]

## 2024-05-02 NOTE — HISTORY OF PRESENT ILLNESS
[FreeTextEntry1] : Ruthann is a pleasant 28-year-old female who presents today for follow up with chronic exacerbated bilateral hand and wrist discomfort as well as numbness and tingling. She also describes left wrist and thumb pain and difficulty with activities that involve pinch and  especially holding her . Of note, she recently gave birth around 2023. The symptoms are bothersome and are affecting her ADLs.  She describes tremendous relief after injections visit, however her left wrist symptoms have returned.

## 2024-05-02 NOTE — PHYSICAL EXAM
[de-identified] : Examination of the [bilateral] wrist reveals discomfort with compression at the level of the volar carpal tunnel eliciting numbness/tingling throughout the fingertips. Examination at the level of the [left] wrist reveals tenderness at the level of the first dorsal compartment with a positive finkelstein.    [de-identified] : [4] views of [bilateral hands and wrists] were reviewed today in my office and were seen by me and discussed with the patient.  These negative.

## 2024-05-31 NOTE — ED ADULT NURSE NOTE - ISOLATION TYPE:
Patient Name: Evelyn Mandujano   YOB: 1991  Referring Primary Care Physician: Sundeep Zacarias APRN      Chief Complaint:    Chief Complaint   Patient presents with    Lumbar Spine - Follow-up, Pain          HPI:  Evelyn Mandujano is a 33 y.o. female who presents to [facility] for first postop visit following right L4-5 microdiscectomy.  Preop radicular pain has almost completely resolved.  Still has a sense of tingling in the right lateral thigh.  Also has persistent weakness with right dorsiflexion and EHL, but perceives it to be somewhat better.    PFSH:  See attached    ROS: As per HPI, otherwise negative    Objective:      33 y.o. female  Body mass index is 39.11 kg/m²., 117 kg (257 lb 3.2 oz), @HT@  Vitals:    05/31/24 1011   Temp: 98.2 °F (36.8 °C)     Pain Score    05/31/24 1011   PainSc:   4   PainLoc: Back            Spine Musculoskeletal Exam    Gait    Gait is normal.    Inspection    Coronal balance: no imbalance    Sagittal balance: no imbalance    Thoracolumbar        Prior incision: midline lumbar    Incision: clean, dry and intact    Strength    Thoracolumbar       Right      Extensor hallucis longus: 4-/5.       Tibialis anterior: 4-/5.       Quadriceps: 4-/5.       Sensory    Thoracolumbar      Right      Lateral thigh: decreased      Lateral leg: decreased        IMAGING:         Assessment:           Diagnoses and all orders for this visit:    1. Status post lumbar spine surgery for decompression of spinal cord (Primary)  -     Ambulatory Referral to Physical Therapy             Plan:  She is being seen for 2-week postoperative follow-up after undergoing lumbar decompression with Dr. Brito.  Patient doing very well postoperatively, resolution of most preop symptoms. The incision is healing nicely with no signs of wound breakdown or infection.  At this point patient should begin outpatient PT for postoperative recovery.Patient should refrain from  lifting any more than 30 pounds.  May shower like normal but should not submerge the incision underwater for another 4 weeks.  Follow-up 3 months postoperatively with Dr. Brito.  Patient is agreeable to this plan.     Return in about 3 months (around 8/31/2024) for Dr Brito.    EMR Dragon/Transcription Disclaimer:   Much of this encounter note is an electronic transcription/translation of spoken language to printed text. The electronic translation of spoken language may permit erroneous, or at times, nonsensical words or phrases to be inadvertently transcribed; Although I have reviewed the note for such errors, some may still exist.  Red flags have been discussed at this or previous visits to include but not limited weakness in extremities, worsening pain that does not respond to conservative treatment and bowel or bladder dysfunction. These are reasons to present to ER and patient has been informed.    The diagnosis(es), natural history, pathophysiology and treatment for diagnosis(es) were discussed. Opportunity given and questions answered. Biomechanics of pertinent body areas discussed.    EXERCISES:  Advice on benefits of, and types of regular/moderate exercise pertaining to diagnosis.  Continue HEP. For back or neck pain, recommend pilates and or yoga as tolerated. Generally it is best to start any new exercise under the guidance of a  or therapist.   MEDICATIONS:  When prescribe, the risks, benefits, warnings,side effects and alternatives of medications discussed. Advised against long term use of narcotics.   PAIN CONTROL:  Cold, heat, OTC lidocaine patches and/or ointment as needed. Avoid direct skin contact with ice. Ice 15-20 minutes 3-4 times daily as needed. For SI joint pain, recommend ice bath in water about 50 degrees for 5 consecutive days, add ice slowly to help with adjustment and may cover with warm towel or robe to help with cold tolerance. If using lidocaine, do not apply heat in  conjunction as this can cause a burn.   MEDICAL RECORDS reviewed from other provider(s) for past and current medical history pertinent to this visit..  Answers submitted by the patient for this visit:  Primary Reason for Visit (Submitted on 5/29/2024)  What is the primary reason for your visit?: Back Pain  Back Pain Questionnaire (Submitted on 5/29/2024)  Chief Complaint: Back pain  Chronicity: chronic  Onset: more than 1 year ago  Frequency: intermittently  Progression since onset: coming and going  Pain location: gluteal, lumbar spine, sacro-iliac  Pain quality: aching, burning, stabbing  Radiates to: right buttock  Pain - numeric: 9/10  Pain is: worse during the night  Aggravated by: position, standing  Stiffness is present: in the morning, at night  abdominal pain: No  bladder incontinence: No  bowel incontinence: No  chest pain: No  dysuria: No  fever: No  leg pain: No  numbness: Yes  paresthesias: No  pelvic pain: No  perianal numbness: No  tingling: No  weakness: Yes  weight loss: No  Risk factors: obesity  Additional Information: Getting worse. Keeps me up at night.     None

## 2024-08-23 ENCOUNTER — APPOINTMENT (OUTPATIENT)
Dept: ORTHOPEDIC SURGERY | Facility: CLINIC | Age: 29
End: 2024-08-23

## 2024-08-27 ENCOUNTER — APPOINTMENT (OUTPATIENT)
Dept: SURGERY | Facility: CLINIC | Age: 29
End: 2024-08-27

## 2024-09-04 NOTE — ED PROVIDER NOTE - CPE EDP NEURO NORM
Addended by: JOE ESPAÑA on: 4/3/2023 08:00 AM     Modules accepted: Orders    
normal...
<-- Click to add NO significant Past Surgical History

## 2025-02-12 ENCOUNTER — APPOINTMENT (OUTPATIENT)
Dept: INTERNAL MEDICINE | Facility: CLINIC | Age: 30
End: 2025-02-12

## 2025-02-13 ENCOUNTER — APPOINTMENT (OUTPATIENT)
Dept: INTERNAL MEDICINE | Facility: CLINIC | Age: 30
End: 2025-02-13

## 2025-04-22 ENCOUNTER — APPOINTMENT (OUTPATIENT)
Facility: CLINIC | Age: 30
End: 2025-04-22

## 2025-04-24 ENCOUNTER — APPOINTMENT (OUTPATIENT)
Dept: OBGYN | Facility: CLINIC | Age: 30
End: 2025-04-24
Payer: COMMERCIAL

## 2025-04-24 VITALS
DIASTOLIC BLOOD PRESSURE: 85 MMHG | SYSTOLIC BLOOD PRESSURE: 123 MMHG | BODY MASS INDEX: 34.55 KG/M2 | HEART RATE: 101 BPM | HEIGHT: 61 IN | WEIGHT: 183 LBS

## 2025-04-24 DIAGNOSIS — Z01.419 ENCOUNTER FOR GYNECOLOGICAL EXAMINATION (GENERAL) (ROUTINE) W/OUT ABNORMAL FINDINGS: ICD-10-CM

## 2025-04-24 DIAGNOSIS — Z78.9 OTHER SPECIFIED HEALTH STATUS: ICD-10-CM

## 2025-04-24 DIAGNOSIS — M25.532 PAIN IN RIGHT WRIST: ICD-10-CM

## 2025-04-24 DIAGNOSIS — Z11.51 ENCOUNTER FOR SCREENING FOR HUMAN PAPILLOMAVIRUS (HPV): ICD-10-CM

## 2025-04-24 DIAGNOSIS — Z12.39 ENCOUNTER FOR OTHER SCREENING FOR MALIGNANT NEOPLASM OF BREAST: ICD-10-CM

## 2025-04-24 DIAGNOSIS — M25.531 PAIN IN RIGHT WRIST: ICD-10-CM

## 2025-04-24 PROCEDURE — 99459 PELVIC EXAMINATION: CPT | Mod: NC

## 2025-04-24 PROCEDURE — 99395 PREV VISIT EST AGE 18-39: CPT

## 2025-05-01 LAB — HPV HIGH+LOW RISK DNA PNL CVX: NOT DETECTED

## 2025-08-27 ENCOUNTER — NON-APPOINTMENT (OUTPATIENT)
Age: 30
End: 2025-08-27